# Patient Record
Sex: MALE | Race: WHITE | NOT HISPANIC OR LATINO | Employment: OTHER | URBAN - METROPOLITAN AREA
[De-identification: names, ages, dates, MRNs, and addresses within clinical notes are randomized per-mention and may not be internally consistent; named-entity substitution may affect disease eponyms.]

---

## 2021-03-15 DIAGNOSIS — Z23 NEED FOR VACCINATION: ICD-10-CM

## 2021-05-05 ENCOUNTER — APPOINTMENT (OUTPATIENT)
Dept: RADIOLOGY | Facility: MEDICAL CENTER | Age: 66
End: 2021-05-05
Attending: EMERGENCY MEDICINE
Payer: MEDICAID

## 2021-05-05 ENCOUNTER — HOSPITAL ENCOUNTER (EMERGENCY)
Facility: MEDICAL CENTER | Age: 66
End: 2021-05-05
Attending: EMERGENCY MEDICINE
Payer: MEDICAID

## 2021-05-05 VITALS
OXYGEN SATURATION: 95 % | BODY MASS INDEX: 28.78 KG/M2 | DIASTOLIC BLOOD PRESSURE: 87 MMHG | SYSTOLIC BLOOD PRESSURE: 138 MMHG | TEMPERATURE: 97.4 F | WEIGHT: 201.06 LBS | HEART RATE: 89 BPM | HEIGHT: 70 IN | RESPIRATION RATE: 16 BRPM

## 2021-05-05 DIAGNOSIS — L03.119 CELLULITIS OF FOOT: ICD-10-CM

## 2021-05-05 LAB
ALBUMIN SERPL BCP-MCNC: 4.1 G/DL (ref 3.2–4.9)
ALBUMIN/GLOB SERPL: 1.2 G/DL
ALP SERPL-CCNC: 99 U/L (ref 30–99)
ALT SERPL-CCNC: 14 U/L (ref 2–50)
ANION GAP SERPL CALC-SCNC: 9 MMOL/L (ref 7–16)
AST SERPL-CCNC: 27 U/L (ref 12–45)
BASOPHILS # BLD AUTO: 0.5 % (ref 0–1.8)
BASOPHILS # BLD: 0.05 K/UL (ref 0–0.12)
BILIRUB SERPL-MCNC: 0.4 MG/DL (ref 0.1–1.5)
BUN SERPL-MCNC: 20 MG/DL (ref 8–22)
CALCIUM SERPL-MCNC: 9.3 MG/DL (ref 8.4–10.2)
CHLORIDE SERPL-SCNC: 103 MMOL/L (ref 96–112)
CO2 SERPL-SCNC: 25 MMOL/L (ref 20–33)
CREAT SERPL-MCNC: 0.92 MG/DL (ref 0.5–1.4)
EOSINOPHIL # BLD AUTO: 0.08 K/UL (ref 0–0.51)
EOSINOPHIL NFR BLD: 0.9 % (ref 0–6.9)
ERYTHROCYTE [DISTWIDTH] IN BLOOD BY AUTOMATED COUNT: 42.9 FL (ref 35.9–50)
GLOBULIN SER CALC-MCNC: 3.3 G/DL (ref 1.9–3.5)
GLUCOSE SERPL-MCNC: 125 MG/DL (ref 65–99)
HCT VFR BLD AUTO: 43.6 % (ref 42–52)
HGB BLD-MCNC: 14.4 G/DL (ref 14–18)
IMM GRANULOCYTES # BLD AUTO: 0.07 K/UL (ref 0–0.11)
IMM GRANULOCYTES NFR BLD AUTO: 0.8 % (ref 0–0.9)
LYMPHOCYTES # BLD AUTO: 2.02 K/UL (ref 1–4.8)
LYMPHOCYTES NFR BLD: 21.7 % (ref 22–41)
MCH RBC QN AUTO: 30.4 PG (ref 27–33)
MCHC RBC AUTO-ENTMCNC: 33 G/DL (ref 33.7–35.3)
MCV RBC AUTO: 92.2 FL (ref 81.4–97.8)
MONOCYTES # BLD AUTO: 0.62 K/UL (ref 0–0.85)
MONOCYTES NFR BLD AUTO: 6.7 % (ref 0–13.4)
NEUTROPHILS # BLD AUTO: 6.48 K/UL (ref 1.82–7.42)
NEUTROPHILS NFR BLD: 69.4 % (ref 44–72)
NRBC # BLD AUTO: 0 K/UL
NRBC BLD-RTO: 0 /100 WBC
PLATELET # BLD AUTO: 240 K/UL (ref 164–446)
PMV BLD AUTO: 10.2 FL (ref 9–12.9)
POTASSIUM SERPL-SCNC: 5 MMOL/L (ref 3.6–5.5)
PROT SERPL-MCNC: 7.4 G/DL (ref 6–8.2)
RBC # BLD AUTO: 4.73 M/UL (ref 4.7–6.1)
SODIUM SERPL-SCNC: 137 MMOL/L (ref 135–145)
WBC # BLD AUTO: 9.3 K/UL (ref 4.8–10.8)

## 2021-05-05 PROCEDURE — 80053 COMPREHEN METABOLIC PANEL: CPT

## 2021-05-05 PROCEDURE — 96365 THER/PROPH/DIAG IV INF INIT: CPT

## 2021-05-05 PROCEDURE — 73630 X-RAY EXAM OF FOOT: CPT | Mod: LT

## 2021-05-05 PROCEDURE — 99284 EMERGENCY DEPT VISIT MOD MDM: CPT

## 2021-05-05 PROCEDURE — 85025 COMPLETE CBC W/AUTO DIFF WBC: CPT

## 2021-05-05 PROCEDURE — 700101 HCHG RX REV CODE 250: Performed by: EMERGENCY MEDICINE

## 2021-05-05 RX ORDER — GABAPENTIN 100 MG/1
100 CAPSULE ORAL 4 TIMES DAILY
COMMUNITY

## 2021-05-05 RX ORDER — ROSUVASTATIN CALCIUM 5 MG/1
5 TABLET, COATED ORAL DAILY
COMMUNITY

## 2021-05-05 RX ORDER — EMPAGLIFLOZIN AND METFORMIN HYDROCHLORIDE 12.5; 1 MG/1; MG/1
1 TABLET ORAL 2 TIMES DAILY
COMMUNITY

## 2021-05-05 RX ORDER — CLINDAMYCIN PHOSPHATE 600 MG/50ML
600 INJECTION, SOLUTION INTRAVENOUS ONCE
Status: COMPLETED | OUTPATIENT
Start: 2021-05-05 | End: 2021-05-05

## 2021-05-05 RX ORDER — LOSARTAN POTASSIUM 100 MG/1
100 TABLET ORAL DAILY
Status: SHIPPED | COMMUNITY
End: 2023-05-03

## 2021-05-05 RX ORDER — CIPROFLOXACIN 500 MG/1
500 TABLET, FILM COATED ORAL 2 TIMES DAILY
Qty: 14 TABLET | Refills: 0 | Status: SHIPPED | OUTPATIENT
Start: 2021-05-05 | End: 2021-11-20

## 2021-05-05 RX ORDER — CLINDAMYCIN HYDROCHLORIDE 300 MG/1
300 CAPSULE ORAL 3 TIMES DAILY
Qty: 40 CAPSULE | Refills: 0 | Status: SHIPPED | OUTPATIENT
Start: 2021-05-05 | End: 2021-11-20

## 2021-05-05 RX ADMIN — CLINDAMYCIN IN 5 PERCENT DEXTROSE 600 MG: 12 INJECTION, SOLUTION INTRAVENOUS at 16:50

## 2021-05-05 NOTE — ED NOTES
Med rec updated and complete  Allergies reviewed  Pt had a list of medications at bedside, went over list of medications and returned list of medications back to pt at bedside.  Pt reports no antibiotics in the last 2 weeks    No current facility-administered medications on file prior to encounter.     Current Outpatient Medications on File Prior to Encounter   Medication Sig Dispense Refill   • Cholecalciferol (D3 PO) Take 1 capsule by mouth every day.     • Dulaglutide (TRULICITY) 0.75 MG/0.5ML Solution Pen-injector Inject 0.5 mL under the skin every 7 days. On Monday     • Empagliflozin-metFORMIN HCl (SYNJARDY) 12.5-1000 MG Tab Take 1 tablet by mouth 2 times a day.     • rosuvastatin (CRESTOR) 5 MG Tab Take 5 mg by mouth every day.     • losartan (COZAAR) 100 MG Tab Take 100 mg by mouth every day.     • gabapentin (NEURONTIN) 100 MG Cap Take 100 mg by mouth 4 times a day. Pt takes @@ 2300, 0100, 0300, and 0500     • Insulin Glargine (BASAGLAR KWIKPEN SC) Inject 17 Units under the skin every evening.

## 2021-05-06 NOTE — DISCHARGE INSTRUCTIONS
Take the antibiotics as prescribed, keep your leg elevated as much as possible.  Return if swelling worsens, or if you develop redness of the foot.  Return immediately if you develop fevers

## 2021-05-06 NOTE — ED PROVIDER NOTES
Continue with same eyeglasses. ED Provider Note    ED Provider    Means of arrival: Private vehicle  History obtained from: Patient  History limited by: None    CHIEF COMPLAINT  Chief Complaint   Patient presents with   • Foot Problem     Left foot stepped on a nail  Sent from doctor for infection       HPI  Dixon Chung is a 65 y.o. male who presents with complaints of plantar puncture wound to the left foot, this happened almost 1 week ago and he has stepped on a nail that went through his shoe.  He is complaining of a swelling, to the foot.  He said no fevers chills or sweats, no shortness of breath no nausea or vomiting.  He does have peripheral neuropathy see he does not experience a significant pain to the area.    REVIEW OF SYSTEMS  See HPI for further details. All other systems are negative.     PAST MEDICAL HISTORY   has a past medical history of Diabetes, Diabetes mellitus type II (7/30/2009), HTN (hypertension), Hyperlipemia (7/30/2009), and Hypertension (7/30/2009).    SOCIAL HISTORY  Social History     Tobacco Use   • Smoking status: Never Smoker   Substance and Sexual Activity   • Alcohol use: Not on file   • Drug use: Yes     Comment: marijuana   • Sexual activity: Yes     Partners: Female       SURGICAL HISTORY   has a past surgical history that includes tonsillectomy and hernia repair.    CURRENT MEDICATIONS  Home Medications     Reviewed by Vicente Betancourt (Pharmacy Tech) on 05/05/21 at 1620  Med List Status: Complete   Medication Last Dose Status   Cholecalciferol (D3 PO) 5/5/2021 Active   Dulaglutide (TRULICITY) 0.75 MG/0.5ML Solution Pen-injector 5/3/2021 Active   Empagliflozin-metFORMIN HCl (SYNJARDY) 12.5-1000 MG Tab 5/5/2021 Active   gabapentin (NEURONTIN) 100 MG Cap 5/5/2021 Active   Insulin Glargine (BASAGLAR KWIKPEN SC) 5/4/2021 Active   lisinopril (PRINIVIL) 20 MG TABS Not Taking Active   losartan (COZAAR) 100 MG Tab 5/5/2021 Active   metformin (GLUCOPHAGE) 1000 MG tablet Not Taking Active   metformin  "(GLUCOPHAGE) 500 MG TABS Not Taking Active   rosuvastatin (CRESTOR) 5 MG Tab 5/5/2021 Active                ALLERGIES  Allergies   Allergen Reactions   • Amlodipine      Peripheral edema   • Lisinopril      Pt is not sure what happen, but knows that he has an allergie to this medication        PHYSICAL EXAM  VITAL SIGNS: /76   Pulse 77   Temp 36.3 °C (97.4 °F) (Temporal)   Resp 16   Ht 1.778 m (5' 10\")   Wt 91.2 kg (201 lb 1 oz)   SpO2 97%   BMI 28.85 kg/m²   Constitutional: Alert in no apparent distress.  HENT: No signs of trauma, Mucous membranes are moist   Eyes:  Conjunctiva normal, Non-icteric.   Neck: Normal range of motion, No tenderness, Supple,  Lymphatic: No lymphadenopathy noted.   Cardiovascular: Regular rate and rhythm, no murmurs.   Thorax & Lungs: Normal breath sounds, No respiratory distress, No wheezing, No chest tenderness.   Abdomen: Bowel sounds normal, Soft, No tenderness, No masses, No pulsatile masses. No peritoneal signs.  Skin: Warm, Dry,Normal color  Back: No bony tenderness, No CVA tenderness.   Extremities:No edema, No tenderness, No cyanosis, there is swelling of the left foot, up to the distal third of the calf, there is a puncture wound in the plantar surface of the ball of the foot, no surrounding erythema, no abscess palpable  Musculoskeletal: Good range of motion in all major joints. No tenderness to palpation or major deformities noted.   Neurologic: Alert ,Oriented x4, Normal motor function, decreased sensory bilateral lower extremity, No focal deficits noted.   Psychiatric: Affect normal, Judgment normal, Mood normal.       MEDICAL DECISION MAKING  This is a 65 y.o. male who presents plantar puncture wound.  The area is swollen, does not appear erythematous and no palpable abscess is found. , and x-ray be done to rule out foreign body.  Be started empirically on antibiotics.    DIAGNOSTIC STUDIES / PROCEDURES    EKG      LABS  ED Provider    Results for orders placed " or performed during the hospital encounter of 05/05/21   CBC WITH DIFFERENTIAL   Result Value Ref Range    WBC 9.3 4.8 - 10.8 K/uL    RBC 4.73 4.70 - 6.10 M/uL    Hemoglobin 14.4 14.0 - 18.0 g/dL    Hematocrit 43.6 42.0 - 52.0 %    MCV 92.2 81.4 - 97.8 fL    MCH 30.4 27.0 - 33.0 pg    MCHC 33.0 (L) 33.7 - 35.3 g/dL    RDW 42.9 35.9 - 50.0 fL    Platelet Count 240 164 - 446 K/uL    MPV 10.2 9.0 - 12.9 fL    Neutrophils-Polys 69.40 44.00 - 72.00 %    Lymphocytes 21.70 (L) 22.00 - 41.00 %    Monocytes 6.70 0.00 - 13.40 %    Eosinophils 0.90 0.00 - 6.90 %    Basophils 0.50 0.00 - 1.80 %    Immature Granulocytes 0.80 0.00 - 0.90 %    Nucleated RBC 0.00 /100 WBC    Neutrophils (Absolute) 6.48 1.82 - 7.42 K/uL    Lymphs (Absolute) 2.02 1.00 - 4.80 K/uL    Monos (Absolute) 0.62 0.00 - 0.85 K/uL    Eos (Absolute) 0.08 0.00 - 0.51 K/uL    Baso (Absolute) 0.05 0.00 - 0.12 K/uL    Immature Granulocytes (abs) 0.07 0.00 - 0.11 K/uL    NRBC (Absolute) 0.00 K/uL   COMP METABOLIC PANEL   Result Value Ref Range    Sodium 137 135 - 145 mmol/L    Potassium 6.1 (H) 3.6 - 5.5 mmol/L    Chloride 103 96 - 112 mmol/L    Co2 25 20 - 33 mmol/L    Anion Gap 9.0 7.0 - 16.0    Glucose 125 (H) 65 - 99 mg/dL    Bun 20 8 - 22 mg/dL    Creatinine 0.92 0.50 - 1.40 mg/dL    Calcium 9.3 8.4 - 10.2 mg/dL    AST(SGOT) 27 12 - 45 U/L    ALT(SGPT) 14 2 - 50 U/L    Alkaline Phosphatase 99 30 - 99 U/L    Total Bilirubin 0.4 0.1 - 1.5 mg/dL    Albumin 4.1 3.2 - 4.9 g/dL    Total Protein 7.4 6.0 - 8.2 g/dL    Globulin 3.3 1.9 - 3.5 g/dL    A-G Ratio 1.2 g/dL   ESTIMATED GFR   Result Value Ref Range    GFR If African American >60 >60 mL/min/1.73 m 2    GFR If Non African American >60 >60 mL/min/1.73 m 2         RADIOLOGY  DX-FOOT-COMPLETE 3+ LEFT   Final Result      1.  There is forefoot swelling without foreign body or soft tissue gas.          COURSE  Pertinent Labs & Imaging studies reviewed. (See chart for details)    5:04 PM - Patient seen and examined at  bedside. Discussed plan of care,    X-ray shows no foreign body.  His potassium was listed very high.  Spoke with the , his sample had been hemolyzed which causes elevation.  A another sample was run and it was 5.0.  This was per the  is out tonight.    Patient has no signs of sepsis.  IV antibiotics were started empirically is discharged home with antibiotics.    Discharged home in stable condition    FINAL IMPRESSION  1. Cellulitis of foot        The note accurately reflects work and decisions made by me.  Barney Deleon D.O.  5/5/2021  6:16 PM

## 2021-05-11 ENCOUNTER — NURSE TRIAGE (OUTPATIENT)
Dept: HEALTH INFORMATION MANAGEMENT | Facility: OTHER | Age: 66
End: 2021-05-11

## 2021-05-11 NOTE — TELEPHONE ENCOUNTER
Pt having mild diarrhea with antibiotic use. Advised home care for pt    Reason for Disposition  • MILD diarrhea and taking antibiotics    Additional Information  • Negative: Shock suspected (e.g., cold/pale/clammy skin, too weak to stand, low BP, rapid pulse)  • Negative: Difficult to awaken or acting confused (e.g., disoriented, slurred speech)  • Negative: Sounds like a life-threatening emergency to the triager  • Negative: Vomiting also present and worse than the diarrhea  • Negative: Blood in stool and without diarrhea  • Negative: SEVERE abdominal pain (e.g., excruciating) and present > 1 hour  • Negative: SEVERE abdominal pain and age > 60  • Negative: Bloody, black, or tarry bowel movements  • Negative: SEVERE diarrhea (e.g., 7 or more times / day more than normal) and age > 60 years  • Negative: Constant abdominal pain lasting > 2 hours  • Negative: Drinking very little and has signs of dehydration (e.g., no urine > 12 hours, very dry mouth, very lightheaded)  • Negative: Patient sounds very sick or weak to the triager  • Negative: SEVERE diarrhea (e.g., 7 or more times / day more than normal) and present > 24 hours (1 day)  • Negative: MODERATE diarrhea (e.g., 4-6 times / day more than normal) and present > 48 hours (2 days)  • Negative: MODERATE diarrhea (e.g., 4-6 times / day more than normal) and age > 70 years  • Negative: Abdominal pain  (Exception: pain clears completely with each passage of diarrhea stool)  • Negative: Fever > 101 F (38.3 C)  • Negative: Blood in the stool  • Negative: Mucus or pus in stool has been present > 2 days and diarrhea is more than mild  • Negative: Weak immune system (e.g., HIV positive, cancer chemo, splenectomy, organ transplant, chronic steroids)  • Negative: Travel to a foreign country in past month  • Negative: Recent antibiotic therapy (i.e., within last 2 months) and diarrhea present > 3 days since antibiotic was stopped  • Negative: Recent hospitalization and  "diarrhea present > 3 days  • Negative: Tube feedings (e.g., nasogastric, g-tube, j-tube)  • Negative: MILD diarrhea (e.g., 1-3 or more stools than normal in past 24 hours) diarrhea without known cause and present > 7 days  • Negative: Patient wants to be seen  • Negative: Diarrhea is a chronic symptom (recurrent or ongoing AND lasting > 4 weeks)  • Negative: SEVERE diarrhea (e.g., 7 or more times / day more than normal)  • Negative: MILD-MODERATE diarrhea (e.g., 1-6 times / day more than normal)    Answer Assessment - Initial Assessment Questions  1. DIARRHEA SEVERITY: \"How bad is the diarrhea?\" \"How many extra stools have you had in the past 24 hours than normal?\"     - NO DIARRHEA (SCALE 0)    - MILD (SCALE 1-3): Few loose or mushy BMs; increase of 1-3 stools over normal daily number of stools; mild increase in ostomy output.    -  MODERATE (SCALE 4-7): Increase of 4-6 stools daily over normal; moderate increase in ostomy output.  * SEVERE (SCALE 8-10; OR 'WORST POSSIBLE'): Increase of 7 or more stools daily over normal; moderate increase in ostomy output; incontinence.      mild  2. ONSET: \"When did the diarrhea begin?\"       2 days ago  3. BM CONSISTENCY: \"How loose or watery is the diarrhea?\"       Loose and watery  4. VOMITING: \"Are you also vomiting?\" If so, ask: \"How many times in the past 24 hours?\"       no  5. ABDOMINAL PAIN: \"Are you having any abdominal pain?\" If yes: \"What does it feel like?\" (e.g., crampy, dull, intermittent, constant)       crampy  6. ABDOMINAL PAIN SEVERITY: If present, ask: \"How bad is the pain?\"  (e.g., Scale 1-10; mild, moderate, or severe)    - MILD (1-3): doesn't interfere with normal activities, abdomen soft and not tender to touch     - MODERATE (4-7): interferes with normal activities or awakens from sleep, tender to touch     - SEVERE (8-10): excruciating pain, doubled over, unable to do any normal activities        cramping  7. ORAL INTAKE: If vomiting, \"Have you been able " "to drink liquids?\" \"How much fluids have you had in the past 24 hours?\"      NA  8. HYDRATION: \"Any signs of dehydration?\" (e.g., dry mouth [not just dry lips], too weak to stand, dizziness, new weight loss) \"When did you last urinate?\"      no  9. EXPOSURE: \"Have you traveled to a foreign country recently?\" \"Have you been exposed to anyone with diarrhea?\" \"Could you have eaten any food that was spoiled?\"      no  10. ANTIBIOTIC USE: \"Are you taking antibiotics now or have you taken antibiotics in the past 2 months?\"        Yes taking antibiotics because he stepped on a nail  11. OTHER SYMPTOMS: \"Do you have any other symptoms?\" (e.g., fever, blood in stool)        no  12. PREGNANCY: \"Is there any chance you are pregnant?\" \"When was your last menstrual period?\"        NA    Protocols used: DIARRHEA-A-OH      "

## 2021-11-20 ENCOUNTER — APPOINTMENT (OUTPATIENT)
Dept: RADIOLOGY | Facility: MEDICAL CENTER | Age: 66
End: 2021-11-20
Attending: EMERGENCY MEDICINE
Payer: MEDICAID

## 2021-11-20 ENCOUNTER — HOSPITAL ENCOUNTER (EMERGENCY)
Facility: MEDICAL CENTER | Age: 66
End: 2021-11-20
Attending: EMERGENCY MEDICINE
Payer: MEDICAID

## 2021-11-20 VITALS
HEART RATE: 71 BPM | HEIGHT: 70 IN | WEIGHT: 202.82 LBS | BODY MASS INDEX: 29.04 KG/M2 | SYSTOLIC BLOOD PRESSURE: 173 MMHG | TEMPERATURE: 97.3 F | RESPIRATION RATE: 16 BRPM | OXYGEN SATURATION: 97 % | DIASTOLIC BLOOD PRESSURE: 87 MMHG

## 2021-11-20 DIAGNOSIS — R22.1 NECK MASS: ICD-10-CM

## 2021-11-20 DIAGNOSIS — I65.23 ATHEROSCLEROSIS OF BOTH CAROTID ARTERIES: ICD-10-CM

## 2021-11-20 DIAGNOSIS — K11.20 PAROTIDITIS: ICD-10-CM

## 2021-11-20 LAB
ALBUMIN SERPL BCP-MCNC: 4.2 G/DL (ref 3.2–4.9)
ALBUMIN/GLOB SERPL: 1.7 G/DL
ALP SERPL-CCNC: 69 U/L (ref 30–99)
ALT SERPL-CCNC: 19 U/L (ref 2–50)
ANION GAP SERPL CALC-SCNC: 12 MMOL/L (ref 7–16)
AST SERPL-CCNC: 20 U/L (ref 12–45)
BASOPHILS # BLD AUTO: 0.6 % (ref 0–1.8)
BASOPHILS # BLD: 0.07 K/UL (ref 0–0.12)
BILIRUB SERPL-MCNC: 0.3 MG/DL (ref 0.1–1.5)
BUN SERPL-MCNC: 15 MG/DL (ref 8–22)
CALCIUM SERPL-MCNC: 9.2 MG/DL (ref 8.4–10.2)
CHLORIDE SERPL-SCNC: 102 MMOL/L (ref 96–112)
CO2 SERPL-SCNC: 22 MMOL/L (ref 20–33)
CREAT SERPL-MCNC: 0.92 MG/DL (ref 0.5–1.4)
EOSINOPHIL # BLD AUTO: 0.09 K/UL (ref 0–0.51)
EOSINOPHIL NFR BLD: 0.7 % (ref 0–6.9)
ERYTHROCYTE [DISTWIDTH] IN BLOOD BY AUTOMATED COUNT: 44.1 FL (ref 35.9–50)
GLOBULIN SER CALC-MCNC: 2.5 G/DL (ref 1.9–3.5)
GLUCOSE SERPL-MCNC: 136 MG/DL (ref 65–99)
HCT VFR BLD AUTO: 40.9 % (ref 42–52)
HGB BLD-MCNC: 14 G/DL (ref 14–18)
IMM GRANULOCYTES # BLD AUTO: 0.08 K/UL (ref 0–0.11)
IMM GRANULOCYTES NFR BLD AUTO: 0.6 % (ref 0–0.9)
LACTATE BLD-SCNC: 1.6 MMOL/L (ref 0.5–2)
LYMPHOCYTES # BLD AUTO: 2.31 K/UL (ref 1–4.8)
LYMPHOCYTES NFR BLD: 18.2 % (ref 22–41)
MCH RBC QN AUTO: 31.4 PG (ref 27–33)
MCHC RBC AUTO-ENTMCNC: 34.2 G/DL (ref 33.7–35.3)
MCV RBC AUTO: 91.7 FL (ref 81.4–97.8)
MONOCYTES # BLD AUTO: 0.69 K/UL (ref 0–0.85)
MONOCYTES NFR BLD AUTO: 5.5 % (ref 0–13.4)
NEUTROPHILS # BLD AUTO: 9.42 K/UL (ref 1.82–7.42)
NEUTROPHILS NFR BLD: 74.4 % (ref 44–72)
NRBC # BLD AUTO: 0 K/UL
NRBC BLD-RTO: 0 /100 WBC
PLATELET # BLD AUTO: 248 K/UL (ref 164–446)
PMV BLD AUTO: 10.1 FL (ref 9–12.9)
POTASSIUM SERPL-SCNC: 5 MMOL/L (ref 3.6–5.5)
PROT SERPL-MCNC: 6.7 G/DL (ref 6–8.2)
RBC # BLD AUTO: 4.46 M/UL (ref 4.7–6.1)
SODIUM SERPL-SCNC: 136 MMOL/L (ref 135–145)
WBC # BLD AUTO: 12.7 K/UL (ref 4.8–10.8)

## 2021-11-20 PROCEDURE — 85025 COMPLETE CBC W/AUTO DIFF WBC: CPT

## 2021-11-20 PROCEDURE — 80053 COMPREHEN METABOLIC PANEL: CPT

## 2021-11-20 PROCEDURE — 70491 CT SOFT TISSUE NECK W/DYE: CPT

## 2021-11-20 PROCEDURE — A9270 NON-COVERED ITEM OR SERVICE: HCPCS | Performed by: EMERGENCY MEDICINE

## 2021-11-20 PROCEDURE — 83605 ASSAY OF LACTIC ACID: CPT

## 2021-11-20 PROCEDURE — 700117 HCHG RX CONTRAST REV CODE 255: Performed by: EMERGENCY MEDICINE

## 2021-11-20 PROCEDURE — 99284 EMERGENCY DEPT VISIT MOD MDM: CPT

## 2021-11-20 PROCEDURE — 700102 HCHG RX REV CODE 250 W/ 637 OVERRIDE(OP): Performed by: EMERGENCY MEDICINE

## 2021-11-20 RX ORDER — AMOXICILLIN AND CLAVULANATE POTASSIUM 875; 125 MG/1; MG/1
1 TABLET, FILM COATED ORAL 2 TIMES DAILY
Qty: 14 TABLET | Refills: 0 | Status: SHIPPED | OUTPATIENT
Start: 2021-11-20 | End: 2021-11-27

## 2021-11-20 RX ORDER — ACETAMINOPHEN 500 MG
500-1000 TABLET ORAL EVERY 6 HOURS PRN
Status: SHIPPED | COMMUNITY
End: 2022-04-25

## 2021-11-20 RX ORDER — AMOXICILLIN AND CLAVULANATE POTASSIUM 875; 125 MG/1; MG/1
1 TABLET, FILM COATED ORAL ONCE
Status: COMPLETED | OUTPATIENT
Start: 2021-11-20 | End: 2021-11-20

## 2021-11-20 RX ADMIN — AMOXICILLIN AND CLAVULANATE POTASSIUM 1 TABLET: 875; 125 TABLET, FILM COATED ORAL at 16:04

## 2021-11-20 RX ADMIN — IOHEXOL 100 ML: 350 INJECTION, SOLUTION INTRAVENOUS at 15:06

## 2021-11-20 ASSESSMENT — FIBROSIS 4 INDEX: FIB4 SCORE: 1.95

## 2021-11-20 ASSESSMENT — PAIN DESCRIPTION - PAIN TYPE: TYPE: ACUTE PAIN

## 2021-11-20 NOTE — ED NOTES
Med rec partially completed per pt   Allergies reviewed  No PO antibiotics in the last 30 days    Pt is unsure of what dose of Trulicity he uses  Home pharmacy ([facility]) is currently closed till Monday  Unable to verify at this time

## 2021-11-20 NOTE — ED TRIAGE NOTES
"Presents complaining of left neck painful swelling recurring since yesterday.  He denies any fever, and he describes escalating pain upon palpation or swallowing.   Chief Complaint   Patient presents with   • Neck Swelling   • Neck Pain   /78   Pulse 80   Temp 36 °C (96.8 °F) (Temporal)   Resp 18   Ht 1.778 m (5' 10\")   Wt 92 kg (202 lb 13.2 oz)   SpO2 99%   BMI 29.10 kg/m²   Has this patient been vaccinated for COVID YES  If not, would they like to be vaccinated while in the ER if eligible?  N/A  Would the patient like to speak with the ERP about the possibility of receiving the COVID vaccine today before making a decision? N/A     "

## 2021-11-20 NOTE — ED PROVIDER NOTES
ED Provider Note    CHIEF COMPLAINT  Chief Complaint   Patient presents with   • Neck Swelling   • Neck Pain       HPI  Dixon Chung is a 66 y.o. male who presents to the emergency department complaining of neck pain and swelling.  Patient has developed pain and swelling on the left side of his neck to the angle of his jaw that started yesterday.  He saw his primary care provider yesterday for routine follow-up of his diabetes but since that time the swelling has increased in size and more than doubled.  Denies any drooling or trismus.  Denies any difficulty swallowing or breathing.  Denies any fevers or chills.  Denies any systemic complaints.    REVIEW OF SYSTEMS  See HPI for further details. All other systems are negative.    PAST MEDICAL HISTORY  Past Medical History:   Diagnosis Date   • Diabetes     type II   • Diabetes mellitus type II 7/30/2009   • HTN (hypertension)    • Hyperlipemia 7/30/2009   • Hypertension 7/30/2009       FAMILY HISTORY  Family History   Problem Relation Age of Onset   • Heart Disease Father         cabg [id number]   • Hypertension Father    • Diabetes Paternal Aunt        SOCIAL HISTORY  Social History     Socioeconomic History   • Marital status: Single     Spouse name: Not on file   • Number of children: Not on file   • Years of education: Not on file   • Highest education level: Not on file   Occupational History   • Not on file   Tobacco Use   • Smoking status: Never Smoker   • Smokeless tobacco: Never Used   Vaping Use   • Vaping Use: Never used   Substance and Sexual Activity   • Alcohol use: Not Currently     Alcohol/week: 2.0 oz     Types: 4 Cans of beer per week   • Drug use: Yes     Comment: Marijuana   • Sexual activity: Yes     Partners: Female   Other Topics Concern   • Not on file   Social History Narrative   • Not on file     Social Determinants of Health     Financial Resource Strain:    • Difficulty of Paying Living Expenses: Not on file   Food Insecurity:    • Worried  About Running Out of Food in the Last Year: Not on file   • Ran Out of Food in the Last Year: Not on file   Transportation Needs:    • Lack of Transportation (Medical): Not on file   • Lack of Transportation (Non-Medical): Not on file   Physical Activity:    • Days of Exercise per Week: Not on file   • Minutes of Exercise per Session: Not on file   Stress:    • Feeling of Stress : Not on file   Social Connections:    • Frequency of Communication with Friends and Family: Not on file   • Frequency of Social Gatherings with Friends and Family: Not on file   • Attends [employer] Services: Not on file   • Active Member of Clubs or Organizations: Not on file   • Attends Club or Organization Meetings: Not on file   • Marital Status: Not on file   Intimate Partner Violence:    • Fear of Current or Ex-Partner: Not on file   • Emotionally Abused: Not on file   • Physically Abused: Not on file   • Sexually Abused: Not on file   Housing Stability:    • Unable to Pay for Housing in the Last Year: Not on file   • Number of Places Lived in the Last Year: Not on file   • Unstable Housing in the Last Year: Not on file       SURGICAL HISTORY  Past Surgical History:   Procedure Laterality Date   • HERNIA REPAIR     • TONSILLECTOMY         CURRENT MEDICATIONS  Home Medications     Reviewed by Vicente Boudreaux (Pharmacy Tech) on 11/20/21 at 1339  Med List Status: Partial   Medication Last Dose Status   acetaminophen (TYLENOL) 500 MG Tab 11/20/2021 Active   Dulaglutide (TRULICITY SC) 11/15/2021 Active   Empagliflozin-metFORMIN HCl (SYNJARDY) 12.5-1000 MG Tab 11/20/2021 Active   gabapentin (NEURONTIN) 100 MG Cap 11/20/2021 Active   insulin glargine (INSULIN GLARGINE) 100 UNIT/ML Solution Pen-injector injection 11/19/2021 Active   losartan (COZAAR) 100 MG Tab 11/20/2021 Active   rosuvastatin (CRESTOR) 5 MG Tab 11/20/2021 Active                ALLERGIES  Allergies   Allergen Reactions   • Amlodipine      Peripheral edema   • Lisinopril      " Pt is not sure what happen, but knows that he has an allergie to this medication        PHYSICAL EXAM  VITAL SIGNS: /78   Pulse 80   Temp 36 °C (96.8 °F) (Temporal)   Resp 16   Ht 1.778 m (5' 10\")   Wt 92 kg (202 lb 13.2 oz)   SpO2 99%   BMI 29.10 kg/m²    Constitutional: Well developed, Well nourished, No acute distress, Non-toxic appearance.   HENT: Normocephalic, Atraumatic, Bilateral external ears normal, poor dentition with one tooth on the left lower related to the gingiva.  No obvious abscess.  No obvious infection  Eyes: PERRL, EOMI, Conjunctiva normal, No discharge.   Neck: Normal range of motion.  Swelling on the angle of the jaw on the left.  Is red and swollen not tense.  Cardiovascular: Normal heart rate, Normal rhythm, No murmurs, No rubs, No gallops.   Thorax & Lungs: Normal breath sounds, No respiratory distress, No wheezing,   Abdomen: Bowel sounds normal, Soft, No tenderness  Skin: Warm, Dry, No erythema, No rash.    Musculoskeletal: Good range of motion in all major joints.  Neurologic: Alert, No focal deficits noted.   Psychiatric: Affect normal    Results for orders placed or performed during the hospital encounter of 11/20/21   CBC WITH DIFFERENTIAL   Result Value Ref Range    WBC 12.7 (H) 4.8 - 10.8 K/uL    RBC 4.46 (L) 4.70 - 6.10 M/uL    Hemoglobin 14.0 14.0 - 18.0 g/dL    Hematocrit 40.9 (L) 42.0 - 52.0 %    MCV 91.7 81.4 - 97.8 fL    MCH 31.4 27.0 - 33.0 pg    MCHC 34.2 33.7 - 35.3 g/dL    RDW 44.1 35.9 - 50.0 fL    Platelet Count 248 164 - 446 K/uL    MPV 10.1 9.0 - 12.9 fL    Neutrophils-Polys 74.40 (H) 44.00 - 72.00 %    Lymphocytes 18.20 (L) 22.00 - 41.00 %    Monocytes 5.50 0.00 - 13.40 %    Eosinophils 0.70 0.00 - 6.90 %    Basophils 0.60 0.00 - 1.80 %    Immature Granulocytes 0.60 0.00 - 0.90 %    Nucleated RBC 0.00 /100 WBC    Neutrophils (Absolute) 9.42 (H) 1.82 - 7.42 K/uL    Lymphs (Absolute) 2.31 1.00 - 4.80 K/uL    Monos (Absolute) 0.69 0.00 - 0.85 K/uL    Eos " (Absolute) 0.09 0.00 - 0.51 K/uL    Baso (Absolute) 0.07 0.00 - 0.12 K/uL    Immature Granulocytes (abs) 0.08 0.00 - 0.11 K/uL    NRBC (Absolute) 0.00 K/uL   COMP METABOLIC PANEL   Result Value Ref Range    Sodium 136 135 - 145 mmol/L    Potassium 5.0 3.6 - 5.5 mmol/L    Chloride 102 96 - 112 mmol/L    Co2 22 20 - 33 mmol/L    Anion Gap 12.0 7.0 - 16.0    Glucose 136 (H) 65 - 99 mg/dL    Bun 15 8 - 22 mg/dL    Creatinine 0.92 0.50 - 1.40 mg/dL    Calcium 9.2 8.4 - 10.2 mg/dL    AST(SGOT) 20 12 - 45 U/L    ALT(SGPT) 19 2 - 50 U/L    Alkaline Phosphatase 69 30 - 99 U/L    Total Bilirubin 0.3 0.1 - 1.5 mg/dL    Albumin 4.2 3.2 - 4.9 g/dL    Total Protein 6.7 6.0 - 8.2 g/dL    Globulin 2.5 1.9 - 3.5 g/dL    A-G Ratio 1.7 g/dL   LACTIC ACID   Result Value Ref Range    Lactic Acid 1.6 0.5 - 2.0 mmol/L   ESTIMATED GFR   Result Value Ref Range    GFR If African American >60 >60 mL/min/1.73 m 2    GFR If Non African American >60 >60 mL/min/1.73 m 2      RADIOLOGY/PROCEDURES  CT-SOFT TISSUE NECK WITH   Final Result   Addendum 1 of 1   There is bilateral internal carotid artery atherosclerotic plaque.      Left internal carotid artery stenosis is probably moderate in degree    estimated at 50-75%.      Right internal carotid artery stenosis is mild in degree estimated less    than 50%.      Findings were discussed with ROBERTO NAVAS on 11/20/2021 4:08 PM.      Final      1.  No acute abnormality      2.  Enlarged thyroid containing subcentimeter nodule             COURSE & MEDICAL DECISION MAKING  Pertinent Labs & Imaging studies reviewed. (See chart for details)      The patient presents to the emergency department with pain and swelling on the left side of the neck for 2 days duration.  Differential diagnosis includes but is not limited to dental abscess parotiditis facial abscess, cellulitis.    The patient's work-up with labs and a CT scan of the neck.  Labs show mild leukocytosis.  Metabolic panel shows mild  hyperglycemia but is otherwise unremarkable.    CT of the neck did not show an abscess.  There is asymmetry in the parotid area on the left which is where the swelling is.  This is reassuring there is no abscess.  I suspect he has parotid otitis.  We will have him eat a sugar-free sour candy.  He be put on Augmentin 875 twice daily for 7 days.  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  Otherwise she is to follow-up with his doctor continue antibiotics as prescribed.  He verbalizes understanding.    The patient expressed concern about his carotid arteries.  He does not have a carotid artery aneurysm or bleeding.  I did scrutinize his CT and see some significant plaquing.  This is further discussed with the radiologist.  The patient does not have strokelike symptoms at this time.  He has not had TIA symptoms.  I discussed this finding with Dr. Cohen on-call for vascular surgery.  He will see the patient in follow-up and follow-up in a surveillance program to see if he needs any intervention.  Does not recommend antiplatelet medications at this time.    The patient is referred to his doctor as well to Dr. Cohen.  Return for pain swelling redness fever or other concerns.  Questions are answered, agreeable to plan and discharged in good condition.    The patient was noted to have elevated blood pressure while in the ER and was counseled to see their doctor within one wee to have this rechecked.    WESTON Christian.  [address] [facility] [id number] [phone]          Garth Andres M.D.  [address] [place] [id number] [phone]    Schedule an appointment as soon as possible for a visit in 1 week          FINAL IMPRESSION  1. Neck mass     2. Parotiditis  amoxicillin-clavulanate (AUGMENTIN) 875-125 MG Tab   3. Atherosclerosis of both carotid arteries         3.          Electronically signed by: Carlos Alberto Dean M.D., 11/20/2021 1:27 PM

## 2021-11-21 NOTE — DISCHARGE INSTRUCTIONS
Take antibiotics as prescribed for the swelling on your neck.  Consider taking the sugar sour candy to help with the swelling.  Return to the emergency department at [facility] for increasing pain, swelling, or other concerns.  Follow-up with your doctor intake antibiotics as prescribed.    You had some narrowing of your carotid artery on the left side.  This is not related to the swelling of today.  This does acquire continued follow-up as an outpatient.  Follow-up with Dr. Cohen within 1 week.

## 2021-11-30 ENCOUNTER — HOSPITAL ENCOUNTER (EMERGENCY)
Facility: MEDICAL CENTER | Age: 66
End: 2021-11-30
Attending: EMERGENCY MEDICINE | Admitting: EMERGENCY MEDICINE
Payer: MEDICAID

## 2021-11-30 VITALS
SYSTOLIC BLOOD PRESSURE: 166 MMHG | WEIGHT: 203.26 LBS | BODY MASS INDEX: 29.1 KG/M2 | RESPIRATION RATE: 16 BRPM | HEART RATE: 82 BPM | OXYGEN SATURATION: 96 % | HEIGHT: 70 IN | TEMPERATURE: 97 F | DIASTOLIC BLOOD PRESSURE: 104 MMHG

## 2021-11-30 DIAGNOSIS — K11.20 PAROTITIS: ICD-10-CM

## 2021-11-30 PROCEDURE — 99282 EMERGENCY DEPT VISIT SF MDM: CPT

## 2021-11-30 RX ORDER — PREDNISONE 20 MG/1
60 TABLET ORAL DAILY
Qty: 15 TABLET | Refills: 0 | Status: SHIPPED | OUTPATIENT
Start: 2021-11-30 | End: 2021-12-05

## 2021-11-30 ASSESSMENT — FIBROSIS 4 INDEX: FIB4 SCORE: 1.2

## 2021-11-30 NOTE — ED PROVIDER NOTES
"ED Provider Note    CHIEF COMPLAINT  Chief Complaint   Patient presents with   • Neck Pain     Pt states was treated last week for a \"swollen salivary gland\", continues to have pain       HPI  Dixon Chung is a 66 y.o. male who presents with left neck pain and swelling.  The patient states he was seen here for a swollen salivary gland about 10 days ago.  He states he finished antibiotics and all it did was caused a yeast infection to the tip of the penis.  He states he said persistent pain and swelling in this region with no improvement but he states it also does not seem to be getting worse.  He does not have any difficulty with breathing or swallowing.  He has not had any associated fevers.  He has not had any nausea or vomiting.    REVIEW OF SYSTEMS  See HPI for further details. All other systems are negative.     PAST MEDICAL HISTORY  Past Medical History:   Diagnosis Date   • Hyperlipemia 7/30/2009   • Hypertension 7/30/2009   • Diabetes mellitus type II 7/30/2009   • COVID-19    • Diabetes     type II   • HTN (hypertension)        FAMILY HISTORY  [unfilled]    SOCIAL HISTORY  Social History     Socioeconomic History   • Marital status: Single     Spouse name: Not on file   • Number of children: Not on file   • Years of education: Not on file   • Highest education level: Not on file   Occupational History   • Not on file   Tobacco Use   • Smoking status: Never Smoker   • Smokeless tobacco: Never Used   Vaping Use   • Vaping Use: Never used   Substance and Sexual Activity   • Alcohol use: Yes     Alcohol/week: 2.0 oz     Types: 4 Cans of beer per week   • Drug use: Yes     Comment: Marijuana   • Sexual activity: Yes     Partners: Female   Other Topics Concern   • Not on file   Social History Narrative   • Not on file     Social Determinants of Health     Financial Resource Strain:    • Difficulty of Paying Living Expenses: Not on file   Food Insecurity:    • Worried About Running Out of Food in the Last " "Year: Not on file   • Ran Out of Food in the Last Year: Not on file   Transportation Needs:    • Lack of Transportation (Medical): Not on file   • Lack of Transportation (Non-Medical): Not on file   Physical Activity:    • Days of Exercise per Week: Not on file   • Minutes of Exercise per Session: Not on file   Stress:    • Feeling of Stress : Not on file   Social Connections:    • Frequency of Communication with Friends and Family: Not on file   • Frequency of Social Gatherings with Friends and Family: Not on file   • Attends Latter day Services: Not on file   • Active Member of Clubs or Organizations: Not on file   • Attends Club or Organization Meetings: Not on file   • Marital Status: Not on file   Intimate Partner Violence:    • Fear of Current or Ex-Partner: Not on file   • Emotionally Abused: Not on file   • Physically Abused: Not on file   • Sexually Abused: Not on file   Housing Stability:    • Unable to Pay for Housing in the Last Year: Not on file   • Number of Places Lived in the Last Year: Not on file   • Unstable Housing in the Last Year: Not on file       SURGICAL HISTORY  Past Surgical History:   Procedure Laterality Date   • HERNIA REPAIR     • TONSILLECTOMY         CURRENT MEDICATIONS  Home Medications    **Home medications have not yet been reviewed for this encounter**         ALLERGIES  Allergies   Allergen Reactions   • Amlodipine      Peripheral edema   • Lisinopril      Pt is not sure what happen, but knows that he has an allergie to this medication        PHYSICAL EXAM  VITAL SIGNS: BP (!) 203/121 Comment: \"white coat syndrome\"  Pulse 83   Temp 36.4 °C (97.5 °F) (Temporal)   Resp 17   Ht 1.778 m (5' 10\")   Wt 92.2 kg (203 lb 4.2 oz)   SpO2 97%   BMI 29.17 kg/m²       Constitutional: Anxious but nontoxic.   HENT: Pain and swelling at the angle of the mandible on the left, Bilateral external ears normal, Oropharynx moist, No oral exudates, Nose normal.   Eyes: PERRLA, EOMI, Conjunctiva " normal, No discharge.   Neck: Normal range of motion, No tenderness, Supple, No stridor.   Lymphatic: No lymphadenopathy noted.   Cardiovascular: Normal heart rate, Normal rhythm, No murmurs, No rubs, No gallops.   Thorax & Lungs: Normal breath sounds, No respiratory distress, No wheezing, No chest tenderness.   Abdomen: Bowel sounds normal, Soft, No tenderness, No masses, No pulsatile masses.   Skin: Warm, Dry, No erythema, No rash.   Back: No tenderness, No CVA tenderness.   Extremities: Intact distal pulses, No edema, No tenderness, No cyanosis, No clubbing.   Neurologic: Alert & oriented x 3, Normal motor function, Normal sensory function, No focal deficits noted.   Psychiatric: Affect normal, Judgment normal, Mood normal.     COURSE & MEDICAL DECISION MAKING  Pertinent Labs & Imaging studies reviewed. (See chart for details)  This a 66-year-old male who presents the emergency department with facial swelling.  I did review his CT scan that did show evidence of proctitis.  The patient finished his course of antibiotics and still has swelling.  The patient will not allow me to repeat laboratory analysis nor the CT scan.  He is adamant that he does not want to have an IV.  He is aware that I cannot further rule out an abscess.  In further speak with the patient he states the swelling seems to be resolving and he just wants an ENT referral.  Unfortunately we do not have ENT on call here today.  The patient did attempt to get in with Dr. Hopper after his last visit and he is unable to get an appointment.  The patient states that he will follow up with his primary care doctor for ENT referral.  I will start the patient on 3 days of prednisone for the swelling.  He is instructed to go to [facility] on [facility] if he changes his mind regarding work-up and treatment as ENT is on-call at that facility.  The patient does not appear toxic.  He is protecting his airway at this time.  He is hypertensive and he  states it is common as he gets anxious when he is at the doctor's office.  He is aware this needs to be rechecked within 10 to 14 days.    FINAL IMPRESSION  1.  Left parotitis    Disposition  The patient will be discharged in stable condition      Electronically signed by: Fransico Rosales M.D., 11/30/2021 3:42 PM

## 2021-11-30 NOTE — DISCHARGE INSTRUCTIONS
Follow-up with your primary care physician as soon as possible for ENT referral.  Go to [facility] on [facility] if you change your mind regarding work-up and emergency department treatment.  Your blood pressure is elevated here in the emergency department and need to follow-up with your primary care doctor within 2 weeks for repeat blood pressure check.

## 2021-11-30 NOTE — ED TRIAGE NOTES
"Chief Complaint   Patient presents with   • Neck Pain     Pt states was treated last week for a \"swollen salivary gland\", continues to have pain     BP (!) 203/121 Comment: \"white coat syndrome\"  Pulse 83   Temp 36.4 °C (97.5 °F) (Temporal)   Resp 17   Ht 1.778 m (5' 10\")   Wt 92.2 kg (203 lb 4.2 oz)   SpO2 97%   BMI 29.17 kg/m²     Has this patient been vaccinated for COVID YES  If not, would they like to be vaccinated while in the ER if eligible?  NA  Would the patient like to speak with the ERP about the possibility of receiving the COVID vaccine today before making a decision? NA      "

## 2022-04-25 ENCOUNTER — PRE-ADMISSION TESTING (OUTPATIENT)
Dept: ADMISSIONS | Facility: MEDICAL CENTER | Age: 67
DRG: 039 | End: 2022-04-25
Attending: SURGERY
Payer: MEDICARE

## 2022-04-25 DIAGNOSIS — Z01.812 PRE-OPERATIVE LABORATORY EXAMINATION: ICD-10-CM

## 2022-04-25 DIAGNOSIS — Z01.810 PRE-OPERATIVE CARDIOVASCULAR EXAMINATION: ICD-10-CM

## 2022-04-25 LAB
ABO GROUP BLD: NORMAL
ANION GAP SERPL CALC-SCNC: 17 MMOL/L (ref 7–16)
BLD GP AB SCN SERPL QL: NORMAL
BUN SERPL-MCNC: 15 MG/DL (ref 8–22)
CALCIUM SERPL-MCNC: 9.3 MG/DL (ref 8.5–10.5)
CHLORIDE SERPL-SCNC: 102 MMOL/L (ref 96–112)
CO2 SERPL-SCNC: 23 MMOL/L (ref 20–33)
CREAT SERPL-MCNC: 0.9 MG/DL (ref 0.5–1.4)
EKG IMPRESSION: NORMAL
ERYTHROCYTE [DISTWIDTH] IN BLOOD BY AUTOMATED COUNT: 45.5 FL (ref 35.9–50)
EST. AVERAGE GLUCOSE BLD GHB EST-MCNC: 163 MG/DL
GFR SERPLBLD CREATININE-BSD FMLA CKD-EPI: 94 ML/MIN/1.73 M 2
GLUCOSE SERPL-MCNC: 190 MG/DL (ref 65–99)
HBA1C MFR BLD: 7.3 % (ref 4–5.6)
HCT VFR BLD AUTO: 46.5 % (ref 42–52)
HGB BLD-MCNC: 15.3 G/DL (ref 14–18)
MCH RBC QN AUTO: 30.5 PG (ref 27–33)
MCHC RBC AUTO-ENTMCNC: 32.9 G/DL (ref 33.7–35.3)
MCV RBC AUTO: 92.6 FL (ref 81.4–97.8)
PLATELET # BLD AUTO: 217 K/UL (ref 164–446)
PMV BLD AUTO: 10.9 FL (ref 9–12.9)
POTASSIUM SERPL-SCNC: 4.6 MMOL/L (ref 3.6–5.5)
RBC # BLD AUTO: 5.02 M/UL (ref 4.7–6.1)
RH BLD: NORMAL
SODIUM SERPL-SCNC: 142 MMOL/L (ref 135–145)
WBC # BLD AUTO: 9 K/UL (ref 4.8–10.8)

## 2022-04-25 PROCEDURE — 86900 BLOOD TYPING SEROLOGIC ABO: CPT

## 2022-04-25 PROCEDURE — 93005 ELECTROCARDIOGRAM TRACING: CPT

## 2022-04-25 PROCEDURE — 80048 BASIC METABOLIC PNL TOTAL CA: CPT

## 2022-04-25 PROCEDURE — 86901 BLOOD TYPING SEROLOGIC RH(D): CPT

## 2022-04-25 PROCEDURE — 83036 HEMOGLOBIN GLYCOSYLATED A1C: CPT

## 2022-04-25 PROCEDURE — 86850 RBC ANTIBODY SCREEN: CPT

## 2022-04-25 PROCEDURE — 85027 COMPLETE CBC AUTOMATED: CPT

## 2022-04-25 PROCEDURE — 93010 ELECTROCARDIOGRAM REPORT: CPT | Performed by: INTERNAL MEDICINE

## 2022-04-25 PROCEDURE — 36415 COLL VENOUS BLD VENIPUNCTURE: CPT

## 2022-04-25 RX ORDER — ASPIRIN 81 MG/1
81 TABLET, CHEWABLE ORAL DAILY
COMMUNITY
End: 2022-12-21 | Stop reason: SDUPTHER

## 2022-04-25 ASSESSMENT — FIBROSIS 4 INDEX: FIB4 SCORE: 1.2

## 2022-04-25 NOTE — PREPROCEDURE INSTRUCTIONS
Patient here for preadmit appointment.  Patient instructed to stop vitamins/supplements/herbal medications/diet pills and given fasting instructions per anesthesia guidelines.  Patient following insulin and aspirin instructions per MD.  Patient instructed to hold Synjardy 3 days prior to procedure and hold losartan morning of surgery per anesthesia guidelines. Patient understands all instructions and denies questions at this time.

## 2022-05-03 NOTE — OR NURSING
COVID-19 Pre-surgery screening:    Left message for Pt to call back if experiencing Covid symptoms, and advised of mask/visitor policies.

## 2022-05-04 ENCOUNTER — HOSPITAL ENCOUNTER (INPATIENT)
Facility: MEDICAL CENTER | Age: 67
LOS: 1 days | DRG: 039 | End: 2022-05-05
Attending: SURGERY | Admitting: SURGERY
Payer: MEDICARE

## 2022-05-04 ENCOUNTER — ANESTHESIA (OUTPATIENT)
Dept: SURGERY | Facility: MEDICAL CENTER | Age: 67
DRG: 039 | End: 2022-05-04
Payer: MEDICARE

## 2022-05-04 ENCOUNTER — ANESTHESIA EVENT (OUTPATIENT)
Dept: SURGERY | Facility: MEDICAL CENTER | Age: 67
DRG: 039 | End: 2022-05-04
Payer: MEDICARE

## 2022-05-04 DIAGNOSIS — G89.18 POST-OP PAIN: Primary | ICD-10-CM

## 2022-05-04 LAB
ABO + RH BLD: NORMAL
GLUCOSE BLD STRIP.AUTO-MCNC: 161 MG/DL (ref 65–99)
GLUCOSE BLD STRIP.AUTO-MCNC: 170 MG/DL (ref 65–99)
GLUCOSE BLD STRIP.AUTO-MCNC: 185 MG/DL (ref 65–99)
GLUCOSE BLD STRIP.AUTO-MCNC: 211 MG/DL (ref 65–99)

## 2022-05-04 PROCEDURE — 160039 HCHG SURGERY MINUTES - EA ADDL 1 MIN LEVEL 3: Performed by: SURGERY

## 2022-05-04 PROCEDURE — 700102 HCHG RX REV CODE 250 W/ 637 OVERRIDE(OP): Performed by: SURGERY

## 2022-05-04 PROCEDURE — 160028 HCHG SURGERY MINUTES - 1ST 30 MINS LEVEL 3: Performed by: SURGERY

## 2022-05-04 PROCEDURE — 95938 SOMATOSENSORY TESTING: CPT | Performed by: SURGERY

## 2022-05-04 PROCEDURE — 700101 HCHG RX REV CODE 250: Performed by: ANESTHESIOLOGY

## 2022-05-04 PROCEDURE — 160035 HCHG PACU - 1ST 60 MINS PHASE I: Performed by: SURGERY

## 2022-05-04 PROCEDURE — 700102 HCHG RX REV CODE 250 W/ 637 OVERRIDE(OP): Performed by: NURSE PRACTITIONER

## 2022-05-04 PROCEDURE — 500368 HCHG DRAIN, 7MM FLAT-FLUTED: Performed by: SURGERY

## 2022-05-04 PROCEDURE — 700105 HCHG RX REV CODE 258: Performed by: SURGERY

## 2022-05-04 PROCEDURE — 700111 HCHG RX REV CODE 636 W/ 250 OVERRIDE (IP): Performed by: SURGERY

## 2022-05-04 PROCEDURE — A9270 NON-COVERED ITEM OR SERVICE: HCPCS | Performed by: ANESTHESIOLOGY

## 2022-05-04 PROCEDURE — 4A11X4G MONITORING OF PERIPHERAL NERVOUS ELECTRICAL ACTIVITY, INTRAOPERATIVE, EXTERNAL APPROACH: ICD-10-PCS | Performed by: SURGERY

## 2022-05-04 PROCEDURE — 160036 HCHG PACU - EA ADDL 30 MINS PHASE I: Performed by: SURGERY

## 2022-05-04 PROCEDURE — 160009 HCHG ANES TIME/MIN: Performed by: SURGERY

## 2022-05-04 PROCEDURE — 03CL0ZZ EXTIRPATION OF MATTER FROM LEFT INTERNAL CAROTID ARTERY, OPEN APPROACH: ICD-10-PCS | Performed by: SURGERY

## 2022-05-04 PROCEDURE — 95940 IONM IN OPERATNG ROOM 15 MIN: CPT | Performed by: SURGERY

## 2022-05-04 PROCEDURE — 501838 HCHG SUTURE GENERAL: Performed by: SURGERY

## 2022-05-04 PROCEDURE — 770001 HCHG ROOM/CARE - MED/SURG/GYN PRIV*

## 2022-05-04 PROCEDURE — 82962 GLUCOSE BLOOD TEST: CPT | Mod: 91

## 2022-05-04 PROCEDURE — 160048 HCHG OR STATISTICAL LEVEL 1-5: Performed by: SURGERY

## 2022-05-04 PROCEDURE — C1725 CATH, TRANSLUMIN NON-LASER: HCPCS | Performed by: SURGERY

## 2022-05-04 PROCEDURE — 03UL0KZ SUPPLEMENT LEFT INTERNAL CAROTID ARTERY WITH NONAUTOLOGOUS TISSUE SUBSTITUTE, OPEN APPROACH: ICD-10-PCS | Performed by: SURGERY

## 2022-05-04 PROCEDURE — 110454 HCHG SHELL REV 250: Performed by: SURGERY

## 2022-05-04 PROCEDURE — 700102 HCHG RX REV CODE 250 W/ 637 OVERRIDE(OP): Performed by: ANESTHESIOLOGY

## 2022-05-04 PROCEDURE — C1781 MESH (IMPLANTABLE): HCPCS | Performed by: SURGERY

## 2022-05-04 PROCEDURE — 700111 HCHG RX REV CODE 636 W/ 250 OVERRIDE (IP): Performed by: ANESTHESIOLOGY

## 2022-05-04 PROCEDURE — 95955 EEG DURING SURGERY: CPT | Performed by: SURGERY

## 2022-05-04 PROCEDURE — 160002 HCHG RECOVERY MINUTES (STAT): Performed by: SURGERY

## 2022-05-04 PROCEDURE — 36415 COLL VENOUS BLD VENIPUNCTURE: CPT

## 2022-05-04 PROCEDURE — 700101 HCHG RX REV CODE 250: Performed by: SURGERY

## 2022-05-04 PROCEDURE — 500257: Performed by: SURGERY

## 2022-05-04 PROCEDURE — A9270 NON-COVERED ITEM OR SERVICE: HCPCS | Performed by: NURSE PRACTITIONER

## 2022-05-04 PROCEDURE — A9270 NON-COVERED ITEM OR SERVICE: HCPCS | Performed by: SURGERY

## 2022-05-04 PROCEDURE — 501837 HCHG SUTURE CV: Performed by: SURGERY

## 2022-05-04 PROCEDURE — 00350 ANES PX MAJOR VSL NECK NOS: CPT | Performed by: ANESTHESIOLOGY

## 2022-05-04 DEVICE — PATCH .8X8CM XENOSURE BIOLOGIC VASCULAR---ORDER IN MULTIPLES OF 5---: Type: IMPLANTABLE DEVICE | Status: FUNCTIONAL

## 2022-05-04 RX ORDER — ONDANSETRON 2 MG/ML
INJECTION INTRAMUSCULAR; INTRAVENOUS PRN
Status: DISCONTINUED | OUTPATIENT
Start: 2022-05-04 | End: 2022-05-04 | Stop reason: SURG

## 2022-05-04 RX ORDER — PROTAMINE SULFATE 10 MG/ML
INJECTION, SOLUTION INTRAVENOUS PRN
Status: DISCONTINUED | OUTPATIENT
Start: 2022-05-04 | End: 2022-05-04 | Stop reason: SURG

## 2022-05-04 RX ORDER — HYDROCODONE BITARTRATE AND ACETAMINOPHEN 5; 325 MG/1; MG/1
1-2 TABLET ORAL EVERY 6 HOURS PRN
Status: DISCONTINUED | OUTPATIENT
Start: 2022-05-04 | End: 2022-05-05 | Stop reason: HOSPADM

## 2022-05-04 RX ORDER — HEPARIN SODIUM,PORCINE 1000/ML
VIAL (ML) INJECTION PRN
Status: DISCONTINUED | OUTPATIENT
Start: 2022-05-04 | End: 2022-05-04 | Stop reason: SURG

## 2022-05-04 RX ORDER — DEXTROSE MONOHYDRATE 25 G/50ML
25 INJECTION, SOLUTION INTRAVENOUS
Status: DISCONTINUED | OUTPATIENT
Start: 2022-05-04 | End: 2022-05-05 | Stop reason: HOSPADM

## 2022-05-04 RX ORDER — LOSARTAN POTASSIUM 50 MG/1
100 TABLET ORAL DAILY
Status: DISCONTINUED | OUTPATIENT
Start: 2022-05-04 | End: 2022-05-05 | Stop reason: HOSPADM

## 2022-05-04 RX ORDER — SODIUM CHLORIDE 9 MG/ML
INJECTION, SOLUTION INTRAVENOUS CONTINUOUS
Status: ACTIVE | OUTPATIENT
Start: 2022-05-04 | End: 2022-05-04

## 2022-05-04 RX ORDER — LIDOCAINE HYDROCHLORIDE 20 MG/ML
INJECTION, SOLUTION EPIDURAL; INFILTRATION; INTRACAUDAL; PERINEURAL PRN
Status: DISCONTINUED | OUTPATIENT
Start: 2022-05-04 | End: 2022-05-04 | Stop reason: SURG

## 2022-05-04 RX ORDER — SCOLOPAMINE TRANSDERMAL SYSTEM 1 MG/1
1 PATCH, EXTENDED RELEASE TRANSDERMAL
Status: DISCONTINUED | OUTPATIENT
Start: 2022-05-04 | End: 2022-05-05 | Stop reason: HOSPADM

## 2022-05-04 RX ORDER — HYDROCODONE BITARTRATE AND ACETAMINOPHEN 5; 325 MG/1; MG/1
1-2 TABLET ORAL EVERY 4 HOURS PRN
Qty: 10 TABLET | Refills: 0 | Status: SHIPPED | OUTPATIENT
Start: 2022-05-04 | End: 2022-05-06

## 2022-05-04 RX ORDER — SODIUM CHLORIDE, SODIUM LACTATE, POTASSIUM CHLORIDE, CALCIUM CHLORIDE 600; 310; 30; 20 MG/100ML; MG/100ML; MG/100ML; MG/100ML
INJECTION, SOLUTION INTRAVENOUS CONTINUOUS
Status: ACTIVE | OUTPATIENT
Start: 2022-05-04 | End: 2022-05-04

## 2022-05-04 RX ORDER — METOCLOPRAMIDE HYDROCHLORIDE 5 MG/ML
INJECTION INTRAMUSCULAR; INTRAVENOUS PRN
Status: DISCONTINUED | OUTPATIENT
Start: 2022-05-04 | End: 2022-05-04 | Stop reason: SURG

## 2022-05-04 RX ORDER — DEXAMETHASONE SODIUM PHOSPHATE 4 MG/ML
4 INJECTION, SOLUTION INTRA-ARTICULAR; INTRALESIONAL; INTRAMUSCULAR; INTRAVENOUS; SOFT TISSUE
Status: DISCONTINUED | OUTPATIENT
Start: 2022-05-04 | End: 2022-05-05 | Stop reason: HOSPADM

## 2022-05-04 RX ORDER — LABETALOL HYDROCHLORIDE 5 MG/ML
10 INJECTION, SOLUTION INTRAVENOUS EVERY 4 HOURS PRN
Status: DISCONTINUED | OUTPATIENT
Start: 2022-05-04 | End: 2022-05-05 | Stop reason: HOSPADM

## 2022-05-04 RX ORDER — ASPIRIN 81 MG/1
81 TABLET, CHEWABLE ORAL DAILY
Status: DISCONTINUED | OUTPATIENT
Start: 2022-05-04 | End: 2022-05-05 | Stop reason: HOSPADM

## 2022-05-04 RX ORDER — METOPROLOL TARTRATE 1 MG/ML
INJECTION, SOLUTION INTRAVENOUS PRN
Status: DISCONTINUED | OUTPATIENT
Start: 2022-05-04 | End: 2022-05-04 | Stop reason: SURG

## 2022-05-04 RX ORDER — CLONIDINE HYDROCHLORIDE 0.1 MG/1
0.1 TABLET ORAL
Status: DISCONTINUED | OUTPATIENT
Start: 2022-05-04 | End: 2022-05-05 | Stop reason: HOSPADM

## 2022-05-04 RX ORDER — HYDRALAZINE HYDROCHLORIDE 20 MG/ML
INJECTION INTRAMUSCULAR; INTRAVENOUS PRN
Status: DISCONTINUED | OUTPATIENT
Start: 2022-05-04 | End: 2022-05-04 | Stop reason: SURG

## 2022-05-04 RX ORDER — CEFAZOLIN SODIUM 1 G/3ML
INJECTION, POWDER, FOR SOLUTION INTRAMUSCULAR; INTRAVENOUS PRN
Status: DISCONTINUED | OUTPATIENT
Start: 2022-05-04 | End: 2022-05-04 | Stop reason: SURG

## 2022-05-04 RX ORDER — HYDROCODONE BITARTRATE AND ACETAMINOPHEN 5; 325 MG/1; MG/1
1-2 TABLET ORAL EVERY 4 HOURS PRN
Qty: 10 TABLET | Refills: 0 | Status: CANCELLED | OUTPATIENT
Start: 2022-05-04 | End: 2022-05-06

## 2022-05-04 RX ORDER — DIPHENHYDRAMINE HYDROCHLORIDE 50 MG/ML
12.5 INJECTION INTRAMUSCULAR; INTRAVENOUS
Status: DISCONTINUED | OUTPATIENT
Start: 2022-05-04 | End: 2022-05-04 | Stop reason: HOSPADM

## 2022-05-04 RX ORDER — GABAPENTIN 100 MG/1
100 CAPSULE ORAL 4 TIMES DAILY
Status: DISCONTINUED | OUTPATIENT
Start: 2022-05-04 | End: 2022-05-05 | Stop reason: HOSPADM

## 2022-05-04 RX ORDER — ONDANSETRON 2 MG/ML
4 INJECTION INTRAMUSCULAR; INTRAVENOUS
Status: DISCONTINUED | OUTPATIENT
Start: 2022-05-04 | End: 2022-05-04 | Stop reason: HOSPADM

## 2022-05-04 RX ORDER — BUPIVACAINE HYDROCHLORIDE AND EPINEPHRINE 5; 5 MG/ML; UG/ML
INJECTION, SOLUTION EPIDURAL; INTRACAUDAL; PERINEURAL
Status: DISCONTINUED | OUTPATIENT
Start: 2022-05-04 | End: 2022-05-04 | Stop reason: HOSPADM

## 2022-05-04 RX ORDER — HALOPERIDOL 5 MG/ML
1 INJECTION INTRAMUSCULAR EVERY 6 HOURS PRN
Status: DISCONTINUED | OUTPATIENT
Start: 2022-05-04 | End: 2022-05-05 | Stop reason: HOSPADM

## 2022-05-04 RX ORDER — ONDANSETRON 2 MG/ML
4 INJECTION INTRAMUSCULAR; INTRAVENOUS EVERY 4 HOURS PRN
Status: DISCONTINUED | OUTPATIENT
Start: 2022-05-04 | End: 2022-05-05 | Stop reason: HOSPADM

## 2022-05-04 RX ORDER — LABETALOL HYDROCHLORIDE 5 MG/ML
INJECTION, SOLUTION INTRAVENOUS PRN
Status: DISCONTINUED | OUTPATIENT
Start: 2022-05-04 | End: 2022-05-04 | Stop reason: SURG

## 2022-05-04 RX ORDER — HYDRALAZINE HYDROCHLORIDE 20 MG/ML
10 INJECTION INTRAMUSCULAR; INTRAVENOUS EVERY 4 HOURS PRN
Status: DISCONTINUED | OUTPATIENT
Start: 2022-05-04 | End: 2022-05-05 | Stop reason: HOSPADM

## 2022-05-04 RX ORDER — HALOPERIDOL 5 MG/ML
1 INJECTION INTRAMUSCULAR
Status: DISCONTINUED | OUTPATIENT
Start: 2022-05-04 | End: 2022-05-04 | Stop reason: HOSPADM

## 2022-05-04 RX ORDER — DIPHENHYDRAMINE HYDROCHLORIDE 50 MG/ML
25 INJECTION INTRAMUSCULAR; INTRAVENOUS EVERY 6 HOURS PRN
Status: DISCONTINUED | OUTPATIENT
Start: 2022-05-04 | End: 2022-05-05 | Stop reason: HOSPADM

## 2022-05-04 RX ORDER — CLONIDINE HYDROCHLORIDE 0.1 MG/1
0.2 TABLET ORAL
Status: COMPLETED | OUTPATIENT
Start: 2022-05-04 | End: 2022-05-04

## 2022-05-04 RX ORDER — ROSUVASTATIN CALCIUM 5 MG/1
5 TABLET, COATED ORAL DAILY
Status: DISCONTINUED | OUTPATIENT
Start: 2022-05-04 | End: 2022-05-05 | Stop reason: HOSPADM

## 2022-05-04 RX ADMIN — SODIUM CHLORIDE, POTASSIUM CHLORIDE, SODIUM LACTATE AND CALCIUM CHLORIDE: 600; 310; 30; 20 INJECTION, SOLUTION INTRAVENOUS at 07:48

## 2022-05-04 RX ADMIN — LABETALOL HYDROCHLORIDE 20 MG: 5 INJECTION, SOLUTION INTRAVENOUS at 10:14

## 2022-05-04 RX ADMIN — FENTANYL CITRATE 100 MCG: 50 INJECTION, SOLUTION INTRAMUSCULAR; INTRAVENOUS at 08:41

## 2022-05-04 RX ADMIN — METOPROLOL TARTRATE 2.5 MG: 5 INJECTION, SOLUTION INTRAVENOUS at 10:03

## 2022-05-04 RX ADMIN — CLONIDINE HYDROCHLORIDE 0.2 MG: 0.1 TABLET ORAL at 19:29

## 2022-05-04 RX ADMIN — FENTANYL CITRATE 50 MCG: 50 INJECTION, SOLUTION INTRAMUSCULAR; INTRAVENOUS at 12:25

## 2022-05-04 RX ADMIN — FENTANYL CITRATE 50 MCG: 50 INJECTION, SOLUTION INTRAMUSCULAR; INTRAVENOUS at 10:54

## 2022-05-04 RX ADMIN — GABAPENTIN 100 MG: 100 CAPSULE ORAL at 18:21

## 2022-05-04 RX ADMIN — LIDOCAINE HYDROCHLORIDE 0.5 ML: 10 INJECTION, SOLUTION EPIDURAL; INFILTRATION; INTRACAUDAL; PERINEURAL at 07:28

## 2022-05-04 RX ADMIN — ONDANSETRON 4 MG: 2 INJECTION INTRAMUSCULAR; INTRAVENOUS at 10:08

## 2022-05-04 RX ADMIN — ROSUVASTATIN CALCIUM 5 MG: 5 TABLET, FILM COATED ORAL at 15:07

## 2022-05-04 RX ADMIN — HYDRALAZINE HYDROCHLORIDE 10 MG: 20 INJECTION INTRAMUSCULAR; INTRAVENOUS at 10:11

## 2022-05-04 RX ADMIN — ASPIRIN 81 MG 81 MG: 81 TABLET ORAL at 15:07

## 2022-05-04 RX ADMIN — METOCLOPRAMIDE 20 MG: 5 INJECTION, SOLUTION INTRAMUSCULAR; INTRAVENOUS at 10:06

## 2022-05-04 RX ADMIN — FENTANYL CITRATE 50 MCG: 50 INJECTION, SOLUTION INTRAMUSCULAR; INTRAVENOUS at 10:59

## 2022-05-04 RX ADMIN — FENTANYL CITRATE 50 MCG: 50 INJECTION, SOLUTION INTRAMUSCULAR; INTRAVENOUS at 11:38

## 2022-05-04 RX ADMIN — HYDRALAZINE HYDROCHLORIDE 10 MG: 20 INJECTION INTRAMUSCULAR; INTRAVENOUS at 10:13

## 2022-05-04 RX ADMIN — LOSARTAN POTASSIUM 100 MG: 50 TABLET, FILM COATED ORAL at 15:07

## 2022-05-04 RX ADMIN — METOPROLOL TARTRATE 5 MG: 5 INJECTION, SOLUTION INTRAVENOUS at 10:11

## 2022-05-04 RX ADMIN — INSULIN HUMAN 2 UNITS: 100 INJECTION, SOLUTION PARENTERAL at 18:45

## 2022-05-04 RX ADMIN — HYDROCODONE BITARTRATE AND ACETAMINOPHEN 1 TABLET: 5; 325 TABLET ORAL at 19:24

## 2022-05-04 RX ADMIN — LIDOCAINE HYDROCHLORIDE 100 MG: 20 INJECTION, SOLUTION EPIDURAL; INFILTRATION; INTRACAUDAL at 08:34

## 2022-05-04 RX ADMIN — INSULIN HUMAN 1 UNITS: 100 INJECTION, SOLUTION PARENTERAL at 21:58

## 2022-05-04 RX ADMIN — ROCURONIUM BROMIDE 50 MG: 10 INJECTION, SOLUTION INTRAVENOUS at 08:34

## 2022-05-04 RX ADMIN — GABAPENTIN 100 MG: 100 CAPSULE ORAL at 21:56

## 2022-05-04 RX ADMIN — FENTANYL CITRATE 100 MCG: 50 INJECTION, SOLUTION INTRAMUSCULAR; INTRAVENOUS at 10:15

## 2022-05-04 RX ADMIN — SODIUM CHLORIDE, POTASSIUM CHLORIDE, SODIUM LACTATE AND CALCIUM CHLORIDE: 600; 310; 30; 20 INJECTION, SOLUTION INTRAVENOUS at 08:28

## 2022-05-04 RX ADMIN — PROTAMINE SULFATE 50 MG: 10 INJECTION, SOLUTION INTRAVENOUS at 09:56

## 2022-05-04 RX ADMIN — PROTAMINE SULFATE 25 MG: 10 INJECTION, SOLUTION INTRAVENOUS at 09:59

## 2022-05-04 RX ADMIN — HYDROCODONE BITARTRATE AND ACETAMINOPHEN 15 MG: 7.5; 325 SOLUTION ORAL at 10:57

## 2022-05-04 RX ADMIN — HEPARIN SODIUM 9000 UNITS: 1000 INJECTION, SOLUTION INTRAVENOUS; SUBCUTANEOUS at 09:26

## 2022-05-04 RX ADMIN — CEFAZOLIN 2 G: 330 INJECTION, POWDER, FOR SOLUTION INTRAMUSCULAR; INTRAVENOUS at 08:29

## 2022-05-04 RX ADMIN — ROCURONIUM BROMIDE 30 MG: 10 INJECTION, SOLUTION INTRAVENOUS at 09:30

## 2022-05-04 RX ADMIN — SUGAMMADEX 200 MG: 100 INJECTION, SOLUTION INTRAVENOUS at 10:17

## 2022-05-04 RX ADMIN — FENTANYL CITRATE 100 MCG: 50 INJECTION, SOLUTION INTRAMUSCULAR; INTRAVENOUS at 09:00

## 2022-05-04 RX ADMIN — METOPROLOL TARTRATE 2.5 MG: 5 INJECTION, SOLUTION INTRAVENOUS at 10:01

## 2022-05-04 RX ADMIN — PROPOFOL 200 MG: 10 INJECTION, EMULSION INTRAVENOUS at 08:34

## 2022-05-04 RX ADMIN — HALOPERIDOL LACTATE 1 MG: 5 INJECTION, SOLUTION INTRAMUSCULAR at 12:05

## 2022-05-04 ASSESSMENT — COGNITIVE AND FUNCTIONAL STATUS - GENERAL
DRESSING REGULAR UPPER BODY CLOTHING: A LITTLE
WALKING IN HOSPITAL ROOM: A LITTLE
CLIMB 3 TO 5 STEPS WITH RAILING: A LITTLE
PERSONAL GROOMING: A LITTLE
MOBILITY SCORE: 18
TOILETING: A LITTLE
STANDING UP FROM CHAIR USING ARMS: A LITTLE
DAILY ACTIVITIY SCORE: 18
MOVING TO AND FROM BED TO CHAIR: A LITTLE
HELP NEEDED FOR BATHING: A LITTLE
EATING MEALS: A LITTLE
TURNING FROM BACK TO SIDE WHILE IN FLAT BAD: A LITTLE
SUGGESTED CMS G CODE MODIFIER DAILY ACTIVITY: CK
DRESSING REGULAR LOWER BODY CLOTHING: A LITTLE
SUGGESTED CMS G CODE MODIFIER MOBILITY: CK
MOVING FROM LYING ON BACK TO SITTING ON SIDE OF FLAT BED: A LITTLE

## 2022-05-04 ASSESSMENT — PAIN DESCRIPTION - PAIN TYPE
TYPE: SURGICAL PAIN

## 2022-05-04 ASSESSMENT — PATIENT HEALTH QUESTIONNAIRE - PHQ9
2. FEELING DOWN, DEPRESSED, IRRITABLE, OR HOPELESS: NOT AT ALL
SUM OF ALL RESPONSES TO PHQ9 QUESTIONS 1 AND 2: 0
1. LITTLE INTEREST OR PLEASURE IN DOING THINGS: NOT AT ALL

## 2022-05-04 ASSESSMENT — LIFESTYLE VARIABLES
ALCOHOL_USE: YES
HAVE PEOPLE ANNOYED YOU BY CRITICIZING YOUR DRINKING: NO
TOTAL SCORE: 0
EVER FELT BAD OR GUILTY ABOUT YOUR DRINKING: NO
DOES PATIENT WANT TO STOP DRINKING: NO
HAVE YOU EVER FELT YOU SHOULD CUT DOWN ON YOUR DRINKING: NO
AVERAGE NUMBER OF DAYS PER WEEK YOU HAVE A DRINK CONTAINING ALCOHOL: 2
TOTAL SCORE: 0
CONSUMPTION TOTAL: POSITIVE
TOTAL SCORE: 0
ON A TYPICAL DAY WHEN YOU DRINK ALCOHOL HOW MANY DRINKS DO YOU HAVE: 2
EVER HAD A DRINK FIRST THING IN THE MORNING TO STEADY YOUR NERVES TO GET RID OF A HANGOVER: NO
HOW MANY TIMES IN THE PAST YEAR HAVE YOU HAD 5 OR MORE DRINKS IN A DAY: 2

## 2022-05-04 ASSESSMENT — FIBROSIS 4 INDEX: FIB4 SCORE: 1.37

## 2022-05-04 NOTE — PROGRESS NOTES
Report received from PACU RN.  Assessment complete.  A&O x 4. Patient calls appropriately.  Patient up with SB assist.  Q4hour neuro check.   Patient has 5/10 pain.   Denies N&V. Tolerating fluids.  Surgical dressings to neck CDI, LYNN drain to neck with serosang output.  + void, PTA BM.  Patient denies SOB.  SCD's on.  Review plan with of care with patient. Call light and personal belongings with in reach. Hourly rounding in place. All needs met at this time.

## 2022-05-04 NOTE — ANESTHESIA PREPROCEDURE EVALUATION
Case: [id number] Date/Time: 05/04/22 0815    Procedures:       ENDARTERECTOMY, CAROTID (Left )      EEG (ELECTROENCEPHALOGRAM)    Pre-op diagnosis: CAROTID ARTERY STENOSIS    Location: [facility] OR 03 / SURGERY [facility]    Surgeons: Armando Harmon M.D.          Relevant Problems   CARDIAC   (positive) Hypertension      ENDO   (positive) Diabetes mellitus, type II (HCC)      Other   (positive) Hyperlipemia       Physical Exam    Airway   Mallampati: IV  TM distance: <3 FB  Neck ROM: full       Cardiovascular - normal exam  Rhythm: regular  Rate: normal  (-) murmur     Dental - normal exam        Facial Hair   Pulmonary - normal exam  Breath sounds clear to auscultation     Abdominal    Neurological - normal exam                 Anesthesia Plan    ASA 3       Plan - general       Airway plan will be ETT          Induction: intravenous      Pertinent diagnostic labs and testing reviewed    Informed Consent:    Anesthetic plan and risks discussed with patient.

## 2022-05-04 NOTE — ANESTHESIA PROCEDURE NOTES
Arterial Line  Performed by: Greg Allison M.D.  Authorized by: Greg Allisno M.D.     Start Time:  5/4/2022 8:38 AM  End Time:  5/4/2022 8:40 AM  Localization: surface landmarks    Patient Location:  OR  Indication: continuous blood pressure monitoring and blood sampling needed        Catheter Size:  20 G  Seldinger Technique?: Yes    Laterality:  Right  Site:  Radial artery  Line Secured:  Transparent dressing, tape and antimicrobial disc  Events: patient tolerated procedure well with no complications

## 2022-05-04 NOTE — OR NURSING
1026: Pt arrived from OR, handoff received from anesthesiologist and RN. Dressing to L neck with gauze and medipore tape C/D/I. LYNN secured under dressing with bloody drainage.     1057: Patient medicated with oral pain meds per anesthesia orders.     1110: Call made to patient's friend to update patient status. No answer at number provided, will reattempt.    1326: Handoff to ELVIRA Justice on T4.

## 2022-05-04 NOTE — OP REPORT
-------------------------------------------------    Vascular Surgery Operative Note  --------------------------------------------    Date of Service: 5/4/2022    Patient Name:  Dixon Mckeon MRN:  [id number]    --------------------------------------------------------------------------------------------------    Preoperative Diagnosis:  - High-grade asymptomatic left carotid stenosis    Postoperative Diagnosis:  Same    Procedure:  1) left carotid endarterectomy with neuromonitoring    -------------------------------------------------------------------------------------------------    Surgeon:   Armando Harmon MD    Assistant:   Natasha BRODERICK    Anesthesia:   GETA    EBL:    15 cc    Heparin:   Systemically heparinized, 9000 units    Specimen:   None sent    Complications:  None    Disposition:   PACU in stable condition    Findings:   Bulky smooth plaque    Justification for use of Surgical First Assist:  An experienced first assistant was utilized during this operation due to the complexity of the operation.  My assistant participated with patient preparation for surgery, incision, surgical exposure including retraction, dissection, and ligation to isolate the target structures and preserve nearby structures, and closure of the field of dissection.  The presence of the expert assist increased the efficiency of the operation and decreased the risk of intraoperative surgical complications.    -----------------------------------------------------------------------------------------------------    History:  Dixon Chung is a 66 y.o. male with high-grade asymptomatic left carotid stenosis.  Left carotid endarterectomy was recommended.  I had a thorough, detailed discussion with the patient regarding the severity of his carotid disease and that he is at high risk of stroke.  I explained the primary purpose of this operation is to prevent stroke from the plaque in the carotid artery.  I  explained the details of the operation including neuromonitoring and possible use of a shunt.  I discussed the alternatives of optimal medical management or stenting, although carotid endarterectomy is preferable in someone who is acceptable risk for surgery.  I discussed the potential risks, including but not limited to bleeding, infection, injury to vessels or nerves, and risks of anesthesia. I explained the risk that the operation itself can cause a stroke, although the risk of stroke from the operation is less than the risk of stroke if the plaque is not treated, and thus carotid endarterectomy is recommended.  All of their questions were answered. Patient understands and agrees to proceed.    Procedure Summary:  Following informed consent, patient was brought to the OR where general anesthesia was administered. Patient was positioned, neuromonitoring was put in place, and the left neck was prepped and draped in the usual sterile fashion.  Timeout was called to identify the correct patient, team and equipment.  Everyone was in agreement.  Procedure began with injection of local anesthetic along the left SCM and then a longitudinal incision was made and carried down through each layer using cautery to assure hemostasis.  The common facial vein was identified and ligated.  The common, external and internal carotid were identified and dissected circumferentially proximally and distally and encircled with loops.    Patient was systemically heparinized and then the artery was clamped, with the ICA being applied first.  The carotid was opened with a longitudinal arteriotomy and a bulky smooth plaque was seen.  There were no changes on EEG monitoring. Endarterectomy of the plaque was performed and the endpoints feathered nicely.  Once complete, a bovine pericardial patch was sutured with a running 6-0 prolene suture.  The artery was flushed and copiously irrigated and the suture line was completed.  The clamps were  removed, with the ICA being removed last, and flow was restored.  There was a continuous doppler flow signal in the ICA at this point with no evidence of stenosis.  The heparin was reversed with protamine. Topical hemostatic was applied.  A 7 flat LYNN drain was placed and secured with a nylon suture.     At this point we had good hemostasis.  The platysma was reapproximated with a running 2-0 vicryl suture.  The skin was reapproximated with a running 4-0 stratafix subcuticular suture.  A sterile dressing was placed. Patient was extubated and sent to PACU in stable condition.  All counts were correct at the conclusion of the case.         Armando Harmon MD  General and Vascular Surgery  [facility]  [phone]

## 2022-05-04 NOTE — CARE PLAN
The patient is Stable - Low risk of patient condition declining or worsening    Shift Goals  Clinical Goals: Pain control    Progress made toward(s) clinical / shift goals:  pain controled.  Problem: Pain - Standard  Goal: Alleviation of pain or a reduction in pain to the patient’s comfort goal  Outcome: Progressing       Patient is not progressing towards the following goals:

## 2022-05-04 NOTE — DISCHARGE PLANNING
Anticipated Discharge Disposition: TBD    Action: Chart review     Barriers to Discharge: None anticipated at this time    Plan: Continue to follow for emerging discharge needs

## 2022-05-04 NOTE — H&P
-------------------------------------------------    Vascular Surgery H&P  -------------------------------------------------------------------------------------------------  Date: 5/4/2022    Surgeon: Armando Harmon M.D. - [facility]  -------------------------------------------------------------------------------------------------    HPI:  This is a 66 y.o. male who is presenting today for elective surgery.  No specific complaints at this time. Questions addressed.      Past Medical History:   Diagnosis Date   • Cataract     bilateral IOL   • COVID-19 07/2021   • Diabetes     type II   • Diabetes mellitus type II 7/30/2009    insulin dependent   • High cholesterol    • HTN (hypertension)    • Hyperlipemia 7/30/2009   • Hypertension 7/30/2009       Past Surgical History:   Procedure Laterality Date   • HERNIA REPAIR     • OTHER Bilateral     cataract extraction with IOL   • OTHER ABDOMINAL SURGERY      cholecystectomy   • TONSILLECTOMY         Current Facility-Administered Medications   Medication Dose Route Frequency Provider Last Rate Last Admin   • lidocaine (XYLOCAINE) 1 % injection 0.5 mL  0.5 mL Intradermal Once PRN Armando Harmon M.D.   0.5 mL at 05/04/22 0728   • lactated ringers infusion   Intravenous Continuous Armando Harmon M.D. 10 mL/hr at 05/04/22 0748 New Bag at 05/04/22 0748       Social History     Socioeconomic History   • Marital status: Single     Spouse name: Not on file   • Number of children: Not on file   • Years of education: Not on file   • Highest education level: Not on file   Occupational History   • Not on file   Tobacco Use   • Smoking status: Never Smoker   • Smokeless tobacco: Never Used   Vaping Use   • Vaping Use: Never used   Substance and Sexual Activity   • Alcohol use: Yes     Alcohol/week: 2.0 oz     Types: 4 Cans of beer per week     Comment: couple of beers on the weekend   • Drug use: Yes     Types: Inhaled     Comment: Marijuana 4/2/22   •  "Sexual activity: Yes     Partners: Female   Other Topics Concern   • Not on file   Social History Narrative   • Not on file     Social Determinants of Health     Financial Resource Strain: Not on file   Food Insecurity: Not on file   Transportation Needs: Not on file   Physical Activity: Not on file   Stress: Not on file   Social Connections: Not on file   Intimate Partner Violence: Not on file   Housing Stability: Not on file       Family History   Problem Relation Age of Onset   • Heart Disease Father         cabg [id number]   • Hypertension Father    • Diabetes Paternal Aunt        Allergies:  Amlodipine, Lisinopril, and Other environmental    Review of Systems:  Noncontributory except as per HPI    Physical Exam:  BP (!) 168/84   Pulse 77   Temp 36.2 °C (97.2 °F) (Temporal)   Resp 18   Ht 1.778 m (5' 10\")   Wt 92.7 kg (204 lb 5.9 oz)   SpO2 95%     Constitutional: Alert, oriented, no acute distress  HEENT:  Normocephalic and atraumatic, EOMI  Neck:   Supple, no JVD,   Cardiovascular: Regular rate and rhythm,   Pulmonary:  Good air entry bilaterally,    Abdominal:  Soft, non-tender, non-distended     Aortic impulse not widened  Musculoskeletal: No edema, no tenderness  Neurological:  CN II-XII grossly intact, no focal deficits  Skin:   Skin is warm and dry. No rash noted.  Psychiatric:  Normal mood and affect.    Labs:                    Assessment/Plan:  This is a 66 y.o. male here today for elective surgery: left carotid endarterectomy with neuromonitoring    I explained the details of the operation, alternatives, and potential risks, including but not limited to bleeding, infection, and risks of anesthesia.  All questions were answered. Patient understands and agrees to proceed.      Armando Harmon MD  [facility] (General and Vascular Surgery)  Cell: [phone] (text or call is fine, if you don't reach me please try my office)  Office: [phone]    5/4/2022    7:57 " AM  ___________________________________________________________________  Patient:Dixon Chung   MRN:[id number]   CSN:[id number]

## 2022-05-04 NOTE — ANESTHESIA PROCEDURE NOTES
Airway    Date/Time: 5/4/2022 8:34 AM  Performed by: Greg Allison M.D.  Authorized by: Greg Allison M.D.     Location:  OR  Urgency:  Elective  Indications for Airway Management:  Anesthesia      Spontaneous Ventilation: absent    Sedation Level:  Deep  Preoxygenated: Yes    Patient Position:  Sniffing  Final Airway Type:  Endotracheal airway  Final Endotracheal Airway:  ETT  Cuffed: Yes    Technique Used for Successful ETT Placement:  Direct laryngoscopy    Insertion Site:  Oral  Blade Type:  Newby  Laryngoscope Blade/Videolaryngoscope Blade Size:  3  ETT Size (mm):  8.0  Measured from:  Teeth  ETT to Teeth (cm):  24  Placement Verified by: auscultation and capnometry    Cormack-Lehane Classification:  Grade I - full view of glottis  Number of Attempts at Approach:  1

## 2022-05-04 NOTE — PROGRESS NOTES
4 Eyes Skin Assessment Completed by Reshma, RN and Shawn, RN.    Head WDL  Ears WDL  Nose WDL  Mouth WDL  Neck Incision dressing to left neck, CDI, LYNN drain with serosang output, DIP, CDI.  Breast/Chest WDL  Shoulder Blades WDL  Spine Redness and Blanching dried scattered scabs  (R) Arm/Elbow/Hand WDL  (L) Arm/Elbow/Hand WDL  Abdomen Scab scattered scabs  Groin WDL  Scrotum/Coccyx/Buttocks WDL  (R) Leg WDL  (L) Leg WDL  (R) Heel/Foot/Toe WDL  (L) Heel/Foot/Toe WDL          Devices In Places Blood Pressure Cuff, Pulse Ox, SCD's and Nasal Cannula      Interventions In Place N/A    Possible Skin Injury No    Pictures Uploaded Into Epic N/A  Wound Consult Placed N/A  RN Wound Prevention Protocol Ordered No

## 2022-05-04 NOTE — ANESTHESIA POSTPROCEDURE EVALUATION
Patient: Dixon Chung    Procedure Summary     Date: 05/04/22 Room / Location: [facility] 03 / SURGERY [facility]    Anesthesia Start: 0828 Anesthesia Stop: 1033    Procedures:       ENDARTERECTOMY, CAROTID (Left Neck)      EEG (ELECTROENCEPHALOGRAM) (N/A Head) Diagnosis: (CAROTID ARTERY STENOSIS)    Surgeons: Armando Harmon M.D. Responsible Provider: Greg Allison M.D.    Anesthesia Type: general ASA Status: 3          Final Anesthesia Type: general  Last vitals  BP   Blood Pressure : 160/82, Arterial BP: (!) 172/60    Temp   36.2 °C (97.1 °F)    Pulse   87   Resp   18    SpO2   97 %      Anesthesia Post Evaluation    Patient location during evaluation: PACU  Patient participation: complete - patient participated  Level of consciousness: awake and alert    Airway patency: patent  Anesthetic complications: no  Cardiovascular status: hemodynamically stable  Respiratory status: acceptable  Hydration status: euvolemic    PONV: none          There were no known complications for this encounter.     Nurse Pain Score: 5 (NPRS)

## 2022-05-05 VITALS
RESPIRATION RATE: 20 BRPM | DIASTOLIC BLOOD PRESSURE: 103 MMHG | TEMPERATURE: 97.2 F | BODY MASS INDEX: 29.26 KG/M2 | HEART RATE: 95 BPM | OXYGEN SATURATION: 95 % | WEIGHT: 204.37 LBS | HEIGHT: 70 IN | SYSTOLIC BLOOD PRESSURE: 155 MMHG

## 2022-05-05 LAB — GLUCOSE BLD STRIP.AUTO-MCNC: 180 MG/DL (ref 65–99)

## 2022-05-05 PROCEDURE — 700102 HCHG RX REV CODE 250 W/ 637 OVERRIDE(OP): Performed by: NURSE PRACTITIONER

## 2022-05-05 PROCEDURE — 700102 HCHG RX REV CODE 250 W/ 637 OVERRIDE(OP): Performed by: SURGERY

## 2022-05-05 PROCEDURE — 82962 GLUCOSE BLOOD TEST: CPT

## 2022-05-05 PROCEDURE — A9270 NON-COVERED ITEM OR SERVICE: HCPCS | Performed by: NURSE PRACTITIONER

## 2022-05-05 PROCEDURE — A9270 NON-COVERED ITEM OR SERVICE: HCPCS | Performed by: SURGERY

## 2022-05-05 RX ORDER — DOCUSATE SODIUM 100 MG/1
100 CAPSULE, LIQUID FILLED ORAL 2 TIMES DAILY
Qty: 15 CAPSULE | Refills: 3 | Status: SHIPPED | OUTPATIENT
Start: 2022-05-05 | End: 2022-10-17

## 2022-05-05 RX ADMIN — CLONIDINE HYDROCHLORIDE 0.1 MG: 0.1 TABLET ORAL at 08:48

## 2022-05-05 RX ADMIN — ASPIRIN 81 MG 81 MG: 81 TABLET ORAL at 06:17

## 2022-05-05 RX ADMIN — ROSUVASTATIN CALCIUM 5 MG: 5 TABLET, FILM COATED ORAL at 06:17

## 2022-05-05 RX ADMIN — LOSARTAN POTASSIUM 100 MG: 50 TABLET, FILM COATED ORAL at 06:17

## 2022-05-05 RX ADMIN — GABAPENTIN 100 MG: 100 CAPSULE ORAL at 08:49

## 2022-05-05 RX ADMIN — INSULIN HUMAN 1 UNITS: 100 INJECTION, SOLUTION PARENTERAL at 08:53

## 2022-05-05 RX ADMIN — HYDROCODONE BITARTRATE AND ACETAMINOPHEN 1 TABLET: 5; 325 TABLET ORAL at 02:50

## 2022-05-05 ASSESSMENT — PAIN DESCRIPTION - PAIN TYPE
TYPE: SURGICAL PAIN

## 2022-05-05 NOTE — CARE PLAN
Problem: Pain - Standard  Goal: Alleviation of pain or a reduction in pain to the patient’s comfort goal  Outcome: Progressing     Problem: Knowledge Deficit - Standard  Goal: Patient and family/care givers will demonstrate understanding of plan of care, disease process/condition, diagnostic tests and medications  Outcome: Progressing     The patient is Stable - Low risk of patient condition declining or worsening    Shift Goals  Clinical Goals: pain control, rest  Patient Goals: rest, pain control  Family Goals: na    Progress made toward(s) clinical / shift goals: Patient's pain controled by prn pain medication. Patient states goals to rest for the night. Call light in reach, patient calls appropriately, bed in locked and lowest position.     Patient is not progressing towards the following goals:

## 2022-05-05 NOTE — DISCHARGE INSTRUCTIONS
Discharge Instructions  Procedure: carotid endarterectomy    1. ACTIVITIES: No strenuous activities or lifting greater than 20 pounds for 2 weeks after surgery.    2. DRIVING: You may drive whenever you are off pain medications and are able to perform the activities needed to drive, i.e. turning, bending, twisting, etc.     3. WOUND: It is not unusual for patients to experience swelling and even bruising, as well as a small amount of drainage from the incision. This is normal and will resolve over the next few days.     4. ICE: you may place ice on the wound to decrease the swelling for the first 24 hours and then discontinue. Apply ice for 20 minutes and then remove for 20 minutes, alternating while awake.    5. BATHING: Remove your white bandage 1-2 days after discharge, no new bandage is required. OK to shower with the bandage on and after the bandage is removed the incision can get wet directly.    6. PAIN MEDICATION: You will be given a prescription for pain medication at discharge. Please take these as directed. It is important to remember not to take medications on an empty stomach as this may cause nausea.     7. BOWEL FUNCTION: After surgery, it is not uncommon for patients to experience constipation. This is due to decreasing activity levels as well as pain medications. You may wish to use a stool softener beginning immediately after surgery, and you may or may not need to use a laxative (Milk of Magnesia, Ex-lax; Senokot, etc.) as well.     8 .CALL IF YOU HAVE: (1) Fevers to more than 101.0 F, (2) Unusual or excessive pain, (3) Drainage or fluid from incision that may be foul smelling, increased tenderness or soreness at the wound or the wound edges are no longer together, redness or swelling at the incision site. Please do not hesitate to call with any other questions.     9. APPOINTMENT: Contact our office at [phone] for a follow-up appointment 2 weeks following your procedure.     If you have any  additional questions, please do not hesitate to call the office and speak to either myself or the physician on call.     Office address:   Armando Harmon MD, [facility]  [address] [place] [address] [address], [place] [address]  [phone]      Discharge Instructions    Discharged to home by car with relative. Discharged via wheelchair, hospital escort: Yes.  Special equipment needed: Not Applicable    Be sure to schedule a follow-up appointment with your primary care doctor or any specialists as instructed.     Discharge Plan:   Diet Plan: Discussed  Activity Level: Discussed  Confirmed Follow up Appointment: Patient to Call and Schedule Appointment  Confirmed Symptoms Management: Discussed  Medication Reconciliation Updated: Yes    I understand that a diet low in cholesterol, fat, and sodium is recommended for good health. Unless I have been given specific instructions below for another diet, I accept this instruction as my diet prescription.   Other diet: Heart healthy    Special Instructions: None    · Is patient discharged on Warfarin / Coumadin?   No     Depression / Suicide Risk    As you are discharged from this [facility] facility, it is important to learn how to keep safe from harming yourself.    Recognize the warning signs:  · Abrupt changes in personality, positive or negative- including increase in energy   · Giving away possessions  · Change in eating patterns- significant weight changes-  positive or negative  · Change in sleeping patterns- unable to sleep or sleeping all the time   · Unwillingness or inability to communicate  · Depression  · Unusual sadness, discouragement and loneliness  · Talk of wanting to die  · Neglect of personal appearance   · Rebelliousness- reckless behavior  · Withdrawal from people/activities they love  · Confusion- inability to concentrate     If you or a loved one observes any of these behaviors or has concerns about self-harm, here's what you can do:  · Talk about  it- your feelings and reasons for harming yourself  · Remove any means that you might use to hurt yourself (examples: pills, rope, extension cords, firearm)  · Get professional help from the community (Mental Health, Substance Abuse, psychological counseling)  · Do not be alone:Call your Safe Contact- someone whom you trust who will be there for you.  · Call your local CRISIS HOTLINE 191-2248 or 998-231-4805  · Call your local Children's Mobile Crisis Response Team [place] (924) 119-2743 or www.[employer]  · Call the toll free National Suicide Prevention Hotlines   · National Suicide Prevention Lifeline 070-383-QMME (3120)  · National Hope Line Network 800-SUICIDE (855-7885)

## 2022-05-05 NOTE — PROGRESS NOTES
-------------------------------------------------    Doing well  Mild-moderate left neck pain but no hematoma, no bleeding, no signficant drainage  No neuro deficits    Home today  Rx for norco and colace sent to pharmacy    Armando Harmon MD  [facility]  Voalte preferred. Otherwise text to cell [phone] or call my office [phone]  __________________________________________________________________  Patient:Dixon Chung   MRN:[id number]   CSN:[id number]

## 2022-05-05 NOTE — PROGRESS NOTES
Bedside report received.  Assessment complete.  A&O x 4. Patient calls appropriately.  Patient up with no assist.   Patient has 5/10 pain.  Neuro intact. Baseline numbness and tinging to BLE.   Denies N&V.  Surgical site to neck CDI.  + void, + flatus, + BM.  Patient denies SOB.  Review plan with of care with patient. Call light and personal belongings with in reach. Hourly rounding in place. All needs met at this time.

## 2022-06-07 ENCOUNTER — DOCUMENTATION (OUTPATIENT)
Dept: VASCULAR LAB | Facility: MEDICAL CENTER | Age: 67
End: 2022-06-07
Payer: MEDICAID

## 2022-06-07 NOTE — PROGRESS NOTES
Referral from [facility]. See Media    Called and left VM for pt to call back to get scheduled with Dr. Bloch. Next available, okay for wait list.

## 2022-06-13 ENCOUNTER — DOCUMENTATION (OUTPATIENT)
Dept: VASCULAR LAB | Facility: MEDICAL CENTER | Age: 67
End: 2022-06-13
Payer: MEDICAID

## 2022-06-13 NOTE — PROGRESS NOTES
Referral from [facility]. See Media     Called and left second VM for pt to call back to get scheduled with Dr. Bloch. Next available, okay for wait list.

## 2022-06-20 ENCOUNTER — DOCUMENTATION (OUTPATIENT)
Dept: VASCULAR LAB | Facility: MEDICAL CENTER | Age: 67
End: 2022-06-20
Payer: MEDICAID

## 2022-06-20 NOTE — PROGRESS NOTES
Referral from [facility]. See Media     Called and left third VM for pt to call back to get scheduled with Dr. Bloch. Next available, okay for wait list.     Pt sent non-compliance letter.

## 2022-06-21 NOTE — PROGRESS NOTES
Patient referred to vascular care  Unfortunately our schedulers have been unable to reach patient despite multiple attempts  Unless patient establishes with a face-to-face visit in our office will be unable to take part in care  Await further patient contact.  Pending further contact, we will defer all further management of vascular disease and its risk factors to PCP and/or referring MD.    Michael J. Bloch, MD  Vascular Care    cc:  ROSETTE bird

## 2022-10-17 ENCOUNTER — OFFICE VISIT (OUTPATIENT)
Dept: VASCULAR LAB | Facility: MEDICAL CENTER | Age: 67
End: 2022-10-17
Attending: INTERNAL MEDICINE
Payer: MEDICAID

## 2022-10-17 VITALS
HEIGHT: 70 IN | BODY MASS INDEX: 29.2 KG/M2 | DIASTOLIC BLOOD PRESSURE: 71 MMHG | SYSTOLIC BLOOD PRESSURE: 138 MMHG | WEIGHT: 204 LBS | HEART RATE: 86 BPM

## 2022-10-17 DIAGNOSIS — I10 HYPERTENSION, UNSPECIFIED TYPE: ICD-10-CM

## 2022-10-17 DIAGNOSIS — Z79.4 TYPE 2 DIABETES MELLITUS WITHOUT COMPLICATION, WITH LONG-TERM CURRENT USE OF INSULIN (HCC): ICD-10-CM

## 2022-10-17 DIAGNOSIS — E11.9 TYPE 2 DIABETES MELLITUS WITHOUT COMPLICATION, WITH LONG-TERM CURRENT USE OF INSULIN (HCC): ICD-10-CM

## 2022-10-17 DIAGNOSIS — I65.22 STENOSIS OF LEFT CAROTID ARTERY: ICD-10-CM

## 2022-10-17 DIAGNOSIS — E78.5 HYPERLIPIDEMIA, UNSPECIFIED HYPERLIPIDEMIA TYPE: ICD-10-CM

## 2022-10-17 PROBLEM — I65.29 CAROTID STENOSIS: Status: ACTIVE | Noted: 2022-10-17

## 2022-10-17 PROCEDURE — 99212 OFFICE O/P EST SF 10 MIN: CPT

## 2022-10-17 PROCEDURE — 99214 OFFICE O/P EST MOD 30 MIN: CPT | Performed by: INTERNAL MEDICINE

## 2022-10-17 RX ORDER — GABAPENTIN 100 MG/1
100 CAPSULE ORAL
COMMUNITY
End: 2022-10-17

## 2022-10-17 RX ORDER — CHLORTHALIDONE 25 MG/1
TABLET ORAL
COMMUNITY
End: 2022-10-17

## 2022-10-17 RX ORDER — PEN NEEDLE, DIABETIC 32GX 5/32"
NEEDLE, DISPOSABLE MISCELLANEOUS
COMMUNITY
Start: 2022-07-26

## 2022-10-17 ASSESSMENT — FIBROSIS 4 INDEX: FIB4 SCORE: 1.37

## 2022-10-17 NOTE — PROGRESS NOTES
VASCULAR MEDICINE CLINIC - INITIAL VISIT  10/17/22     CC:  Dixon Chung is a 66 y.o.  male who presents today  for initial vascular evaluation    Subjective      HPI:  Patient referred for evaluation and management of carotid artery disease in the setting of dyslipidemia, hypertension, diabetes  Patient found to have high-grade left carotid stenosis as incidental finding on imaging study  Had left carotid endarterectomy March 2022  No TIA or stroke symptoms before since  No other personal history of ASCVD  Most recent a1c 7.1  FS usually 100-130 in am fasting  Denies hypoglycemia  Stable insulin dose and other medications  On losartan for BP as monotherapy  Had leg swelling on amlodipine  BPs at home usually 120-140/70-80  No myalgias on low dose rosuvastatin  Takes aspirin daily  No smoking    Past Medical History:   Diagnosis Date    Cataract     bilateral IOL    COVID-19 07/2021    Diabetes     type II    Diabetes mellitus type II 7/30/2009    insulin dependent    High cholesterol     HTN (hypertension)     Hyperlipemia 7/30/2009    Hypertension 7/30/2009        Past Surgical History:   Procedure Laterality Date    OR THROMBOENDARTECTMY NECK,NECK INCIS Left 5/4/2022    Procedure: ENDARTERECTOMY, CAROTID;  Surgeon: Armando Harmon M.D.;  Location: SURGERY [facility];  Service: Vascular    EEG N/A 5/4/2022    Procedure: EEG (ELECTROENCEPHALOGRAM);  Surgeon: Armando Harmon M.D.;  Location: SURGERY [facility];  Service: Vascular    HERNIA REPAIR      OTHER Bilateral     cataract extraction with IOL    OTHER ABDOMINAL SURGERY      cholecystectomy    TONSILLECTOMY          Family History   Problem Relation Age of Onset    Heart Disease Father         cabg 44    Hypertension Father     Diabetes Paternal Aunt         Social History     Tobacco Use    Smoking status: Never    Smokeless tobacco: Never   Vaping Use    Vaping Use: Never used   Substance Use Topics    Alcohol use: Yes     Alcohol/week: 2.0  "oz     Types: 4 Cans of beer per week     Comment: couple of beers on the weekend    Drug use: Yes     Types: Inhaled     Comment: Marijuana 4/2/22        Current Outpatient Medications on File Prior to Visit   Medication Sig Dispense Refill    BD PEN NEEDLE LOS U/F USE TO INJECT INSULIN AS DIRECTED      aspirin (ASA) 81 MG Chew Tab chewable tablet Chew 81 mg every day.      Dulaglutide (TRULICITY SC) Inject 1 Dose under the skin every Monday.      Empagliflozin-metFORMIN HCl (SYNJARDY) 12.5-1000 MG Tab Take 1 tablet by mouth 2 times a day.      rosuvastatin (CRESTOR) 5 MG Tab Take 5 mg by mouth every day.      losartan (COZAAR) 100 MG Tab Take 100 mg by mouth every day.      gabapentin (NEURONTIN) 100 MG Cap Take 100 mg by mouth 4 times a day. Pt takes @@ 2300, 0100, 0300, and 0500      insulin glargine (LANTUS) 100 UNIT/ML Solution Pen-injector injection Inject 20 Units under the skin every evening.       No current facility-administered medications on file prior to visit.        ALLERGIES  Amlodipine, Lisinopril, and Other environmental     DIET AND EXERCISE:  Weight Change:  up about 2 pounds  Diet: decent diabeteic diet  Exercise: sporadic irregular exercise             Objective     Objective:     Vitals:    10/17/22 1440 10/17/22 1443   BP: (!) 146/77 138/71   BP Location: Left arm Left arm   Patient Position: Sitting Sitting   BP Cuff Size: Adult Adult   Pulse: 89 86   Weight: 92.5 kg (204 lb)    Height: 1.778 m (5' 10\")         Physical Exam  Vitals reviewed.   Constitutional:       General: He is not in acute distress.     Appearance: He is not diaphoretic.   HENT:      Head: Normocephalic and atraumatic.   Eyes:      General: No scleral icterus.     Conjunctiva/sclera: Conjunctivae normal.   Neck:      Vascular: No carotid bruit.   Cardiovascular:      Rate and Rhythm: Normal rate and regular rhythm.      Heart sounds: Normal heart sounds. No murmur heard.  Pulmonary:      Effort: Pulmonary effort is " normal. No respiratory distress.      Breath sounds: Normal breath sounds. No wheezing or rales.   Musculoskeletal:      Right lower leg: No edema.      Left lower leg: No edema.   Skin:     Coloration: Skin is not pale.   Neurological:      General: No focal deficit present.      Mental Status: He is alert and oriented to person, place, and time.      Cranial Nerves: No cranial nerve deficit.      Coordination: Coordination normal.      Gait: Gait is intact. Gait normal.   Psychiatric:         Mood and Affect: Mood and affect normal.         Behavior: Behavior normal.        DATA REVIEW    No results found for: CHOLSTRLTOT, LDL, HDL, TRIGLYCERIDE    Lab Results   Component Value Date/Time    SODIUM 142 04/25/2022 09:01 AM    POTASSIUM 4.6 04/25/2022 09:01 AM    CHLORIDE 102 04/25/2022 09:01 AM    CO2 23 04/25/2022 09:01 AM    GLUCOSE 190 (H) 04/25/2022 09:01 AM    BUN 15 04/25/2022 09:01 AM    CREATININE 0.90 04/25/2022 09:01 AM     Lab Results   Component Value Date/Time    ALKPHOSPHAT 69 11/20/2021 01:40 PM    ASTSGOT 20 11/20/2021 01:40 PM    ALTSGPT 19 11/20/2021 01:40 PM    TBILIRUBIN 0.3 11/20/2021 01:40 PM       Lab Results   Component Value Date/Time    HBA1C 7.3 (H) 04/25/2022 09:01 AM       No results found for: MICROALBCALC, MALBCRT, MALBEXCR, [id number], MICROALBUR, MICRALB, UMICROALBUM, MICROALBTIM        Medical Decision Making:  Today's Assessment / Status / Plan:     1. Type 2 diabetes mellitus without complication, with long-term current use of insulin (HCC)  HEMOGLOBIN A1C    MICROALBUMIN CREAT RATIO URINE      2. Hyperlipidemia, unspecified hyperlipidemia type  Lipid Profile    Comp Metabolic Panel    TSH      3. Hypertension, unspecified type  Comp Metabolic Panel    MICROALBUMIN CREAT RATIO URINE      4. Stenosis of left carotid artery  US-CAROTID DOPPLER BILAT           Patient Type: Secondary Prevention    Etiology of Established CVD if Present:     Carotid stenosis, asymptomatic, status post  left CEA March 2022 -doing well postoperatively.  Recheck carotid ultrasound.  Otherwise medical management as per below    Lipid Management: Qualifies for Statin Therapy Based on 2018 ACC/AHA Guidelines: yes  Calculated 10-Year Risk of ASCVD: N/A  Currently on Statin: Yes  Goal LDL less than 70 non-HDL less than 100  Currently on moderate intensity statin  Plan:  -Recheck fasting lipid panel lipoprotein a  -Continue rosuvastatin 5 mg a day pending result  -Intensify therapy if above goal    Blood Pressure Management:  Acc/aha (2017) Blood Pressure Goal <130/80  Appears above goal both in the office and at home  Leg swelling on amlodipine  Did not tolerate chlorthalidone for unclear reasons in the past  Patient would like to try intensify lifestyle modification before increasing meds  Plan:  -Continue losartan 100 mg daily  -Intensify lifestyle modification as per below  -Recheck GFR, electrolytes, urine for albumin  -Continue home blood pressure monitoring  -Intensify therapy if above goal -likely reasonable to add low-dose hydrochlorothiazide    Glycemic Status: Diabetic  Goal A1c less than 7.5%  Avoidance of hypoglycemia paramount  Control seems reasonable on most recent blood work and fingersticks  Plan:  -Continue current dose of Lantus   -Continue Synjardy   -Continue Trulicity   -Recheck A1c and urine for albumin with next blood work  -Call if any hypoglycemia-  -Continue fingersticks    Anti-Platelet/Anti-Coagulant Tx: yes  Continue indefinite low-dose aspirin    Smoking: continue complete avoidance of all tobacco products    Physical Activity: More regular walking recommended    Weight Management and Nutrition: Continue good diabetic diet    Instructed to follow-up with PCP for remainder of adult medical needs: yes  We will partner with other providers in the management of established vascular disease and cardiometabolic risk factors.    Studies to Be Obtained: Carotid duplex   Labs to Be Obtained: as above  prior to next visit    Follow up in: 2 months    Michael J Bloch, M.D.     Cc: KP Harmon

## 2022-11-08 ENCOUNTER — PATIENT MESSAGE (OUTPATIENT)
Dept: HEALTH INFORMATION MANAGEMENT | Facility: OTHER | Age: 67
End: 2022-11-08

## 2022-12-08 ENCOUNTER — APPOINTMENT (OUTPATIENT)
Dept: RADIOLOGY | Facility: MEDICAL CENTER | Age: 67
End: 2022-12-08
Attending: INTERNAL MEDICINE
Payer: MEDICAID

## 2022-12-19 ENCOUNTER — DOCUMENTATION (OUTPATIENT)
Dept: VASCULAR LAB | Facility: MEDICAL CENTER | Age: 67
End: 2022-12-19
Payer: MEDICAID

## 2022-12-19 ENCOUNTER — HOSPITAL ENCOUNTER (OUTPATIENT)
Dept: RADIOLOGY | Facility: MEDICAL CENTER | Age: 67
End: 2022-12-19
Attending: INTERNAL MEDICINE
Payer: MEDICAID

## 2022-12-19 DIAGNOSIS — I65.22 STENOSIS OF LEFT CAROTID ARTERY: ICD-10-CM

## 2022-12-19 PROCEDURE — 93880 EXTRACRANIAL BILAT STUDY: CPT

## 2022-12-20 NOTE — PROGRESS NOTES
Carotid duplex without signficiant stenosis.   Will recheck in one year.     Michael Bloch, MD  Vascular Care

## 2022-12-21 ENCOUNTER — OFFICE VISIT (OUTPATIENT)
Dept: VASCULAR LAB | Facility: MEDICAL CENTER | Age: 67
End: 2022-12-21
Attending: INTERNAL MEDICINE
Payer: MEDICAID

## 2022-12-21 VITALS
DIASTOLIC BLOOD PRESSURE: 100 MMHG | HEIGHT: 70 IN | WEIGHT: 199.5 LBS | SYSTOLIC BLOOD PRESSURE: 207 MMHG | HEART RATE: 99 BPM | BODY MASS INDEX: 28.56 KG/M2

## 2022-12-21 DIAGNOSIS — E11.9 TYPE 2 DIABETES MELLITUS WITHOUT COMPLICATION, WITH LONG-TERM CURRENT USE OF INSULIN (HCC): ICD-10-CM

## 2022-12-21 DIAGNOSIS — Z79.4 TYPE 2 DIABETES MELLITUS WITHOUT COMPLICATION, WITH LONG-TERM CURRENT USE OF INSULIN (HCC): ICD-10-CM

## 2022-12-21 DIAGNOSIS — I10 HYPERTENSION, UNSPECIFIED TYPE: ICD-10-CM

## 2022-12-21 DIAGNOSIS — E78.5 HYPERLIPIDEMIA, UNSPECIFIED HYPERLIPIDEMIA TYPE: ICD-10-CM

## 2022-12-21 DIAGNOSIS — I65.22 STENOSIS OF LEFT CAROTID ARTERY: ICD-10-CM

## 2022-12-21 PROCEDURE — 99214 OFFICE O/P EST MOD 30 MIN: CPT

## 2022-12-21 PROCEDURE — 99212 OFFICE O/P EST SF 10 MIN: CPT

## 2022-12-21 RX ORDER — ASPIRIN 81 MG/1
81 TABLET, CHEWABLE ORAL DAILY
Qty: 100 TABLET | Refills: 3 | Status: SHIPPED | OUTPATIENT
Start: 2022-12-21 | End: 2023-11-16

## 2022-12-21 RX ORDER — HYDROCHLOROTHIAZIDE 25 MG/1
25 TABLET ORAL DAILY
Qty: 90 TABLET | Refills: 3 | Status: SHIPPED | OUTPATIENT
Start: 2022-12-21 | End: 2023-05-03

## 2022-12-21 ASSESSMENT — FIBROSIS 4 INDEX: FIB4 SCORE: 1.4

## 2022-12-21 NOTE — PROGRESS NOTES
VASCULAR MEDICINE CLINIC - Follow up VISIT  12/21/22     CC:  Dixon Chung is a 67 y.o.  male who presents today  for follow up vascular evaluation    Subjective      HPI:  Patient referred for evaluation and management of carotid artery disease in the setting of dyslipidemia, hypertension, diabetes and found to have high-grade left carotid stenosis as incidental finding on imaging study had left carotid endarterectomy March 2022    Had echo done earlier this month  No TIA or stroke symptoms   FS usually 100-180 in am fasting  Denies hypoglycemia  Stable insulin dose and other medications, sees RONAK for DM meds, has appt in January   On losartan for BP as monotherapy, tolerating  BPs at home usually 120-140/70-80s but rare readings  On rosuvastatin, no myalgias   Takes aspirin daily, but has been unable to find in stores recently so hasn't been taking it  Recently had the flu last month, is now starting to feel better  Has lost some weight, watching diet, limiting sugars  Decreased activity and exercise due to cold weather  Did not get labs drawn     Past Medical History:   Diagnosis Date    Cataract     bilateral IOL    COVID-19 07/2021    Diabetes     type II    Diabetes mellitus type II 7/30/2009    insulin dependent    High cholesterol     HTN (hypertension)     Hyperlipemia 7/30/2009    Hypertension 7/30/2009        Past Surgical History:   Procedure Laterality Date    AL THROMBOENDARTECTMY NECK,NECK INCIS Left 5/4/2022    Procedure: ENDARTERECTOMY, CAROTID;  Surgeon: Armando Harmon M.D.;  Location: SURGERY [facility];  Service: Vascular    EEG N/A 5/4/2022    Procedure: EEG (ELECTROENCEPHALOGRAM);  Surgeon: Armando Harmon M.D.;  Location: SURGERY [facility];  Service: Vascular    HERNIA REPAIR      OTHER Bilateral     cataract extraction with IOL    OTHER ABDOMINAL SURGERY      cholecystectomy    TONSILLECTOMY          Family History   Problem Relation Age of Onset    Heart Disease Father          cabg 44    Hypertension Father     Diabetes Paternal Aunt         Social History     Tobacco Use    Smoking status: Never    Smokeless tobacco: Never   Vaping Use    Vaping Use: Never used   Substance Use Topics    Alcohol use: Yes     Alcohol/week: 2.0 oz     Types: 4 Cans of beer per week     Comment: couple of beers on the weekend    Drug use: Yes     Types: Inhaled     Comment: Marijuana 4/2/22        Current Outpatient Medications on File Prior to Visit   Medication Sig Dispense Refill    BD PEN NEEDLE LOS U/F USE TO INJECT INSULIN AS DIRECTED      aspirin (ASA) 81 MG Chew Tab chewable tablet Chew 81 mg every day.      Dulaglutide (TRULICITY SC) Inject 1 Dose under the skin every Monday.      Empagliflozin-metFORMIN HCl (SYNJARDY) 12.5-1000 MG Tab Take 1 tablet by mouth 2 times a day.      rosuvastatin (CRESTOR) 5 MG Tab Take 5 mg by mouth every day.      losartan (COZAAR) 100 MG Tab Take 100 mg by mouth every day.      gabapentin (NEURONTIN) 100 MG Cap Take 100 mg by mouth 4 times a day. Pt takes @@ 2300, 0100, 0300, and 0500      insulin glargine (LANTUS) 100 UNIT/ML Solution Pen-injector injection Inject 20 Units under the skin every evening.       No current facility-administered medications on file prior to visit.        ALLERGIES  Amlodipine, Lisinopril, and Other environmental     DIET AND EXERCISE:  Weight Change:  up about 2 pounds  Diet: decent diabeteic diet  Exercise: sporadic irregular exercise             Objective     Objective:     There were no vitals filed for this visit.       Physical Exam  Vitals reviewed.   Constitutional:       General: He is not in acute distress.     Appearance: Normal appearance. He is not diaphoretic.   HENT:      Head: Normocephalic and atraumatic.   Eyes:      General: No scleral icterus.     Conjunctiva/sclera: Conjunctivae normal.   Cardiovascular:      Rate and Rhythm: Normal rate and regular rhythm.      Heart sounds: Normal heart sounds. No murmur  heard.  Pulmonary:      Effort: Pulmonary effort is normal. No respiratory distress.      Breath sounds: Normal breath sounds. No wheezing or rales.   Musculoskeletal:      Right lower leg: No edema.      Left lower leg: No edema.   Skin:     General: Skin is warm and dry.      Coloration: Skin is not pale.   Neurological:      General: No focal deficit present.      Mental Status: He is alert and oriented to person, place, and time.      Coordination: Coordination normal.      Gait: Gait is intact. Gait normal.   Psychiatric:         Mood and Affect: Mood and affect normal.         Behavior: Behavior normal.        DATA REVIEW    No results found for: CHOLSTRLTOT, LDL, HDL, TRIGLYCERIDE    Lab Results   Component Value Date/Time    SODIUM 142 04/25/2022 09:01 AM    POTASSIUM 4.6 04/25/2022 09:01 AM    CHLORIDE 102 04/25/2022 09:01 AM    CO2 23 04/25/2022 09:01 AM    GLUCOSE 190 (H) 04/25/2022 09:01 AM    BUN 15 04/25/2022 09:01 AM    CREATININE 0.90 04/25/2022 09:01 AM     Lab Results   Component Value Date/Time    ALKPHOSPHAT 69 11/20/2021 01:40 PM    ASTSGOT 20 11/20/2021 01:40 PM    ALTSGPT 19 11/20/2021 01:40 PM    TBILIRUBIN 0.3 11/20/2021 01:40 PM       Lab Results   Component Value Date/Time    HBA1C 7.3 (H) 04/25/2022 09:01 AM       No results found for: MICROALBCALC, MALBCRT, MALBEXCR, [id number], MICROALBUR, MICRALB, UMICROALBUM, MICROALBTIM          Carotid US 12/2022   FINDINGS   Right-   Heterogeneous plaque of the bifurcation extending into the internal carotid    artery. <50% internal carotid artery stenosis.    Plaque is irregular on the surface and heterogeneous with mixed    echogenicity.    The subclavian artery waveforms are antegrade and normal in character and    velocity.    Left-   Carotid endarterectomy is present and appears patent.    Elevated velocities in the proximal subclavian artery consistent with >50%    stenosis.     Medical Decision Making:  Today's Assessment / Status / Plan:      1. Type 2 diabetes mellitus without complication, with long-term current use of insulin (HCC)        2. Hyperlipidemia, unspecified hyperlipidemia type        3. Hypertension, unspecified type        4. Stenosis of left carotid artery             Patient Type: Secondary Prevention    Etiology of Established CVD if Present:     Carotid stenosis, asymptomatic, status post left CEA March 2022 -doing well postoperatively.  Carotid US done 12/2022 showing no significant stenosis.  Recheck carotid ultrasound 1 year, due 12/2023.  Medical management as per below    Lipid Management: Qualifies for Statin Therapy Based on 2018 ACC/AHA Guidelines: yes  Calculated 10-Year Risk of ASCVD: N/A  Currently on Statin: Yes  Goal LDL less than 70 non-HDL less than 100  Currently on moderate intensity statin  Plan:  -Recheck fasting lipid panel and lipoprotein a - orders reprinted, pt to do prior to next appt  -Continue rosuvastatin 5 mg a day pending result  -Intensify therapy if above goal    Blood Pressure Management:  Acc/aha (2017) Blood Pressure Goal <130/80  Appears above goal both in the office and at home  Leg swelling on amlodipine  Did not tolerate chlorthalidone for unclear reasons in the past  Patient would like to try intensify lifestyle modification before increasing meds  Unclear BPs at home, and office bps very elevated, pt reports they are typically elevated in office.  Denies HA, vision changes, or other CVA/TIA like symptoms.    Plan:  -Continue losartan 100 mg daily  -Intensify lifestyle modification as per below  -Recheck GFR, electrolytes, urine for albumin - orders reprinted  -Continue home blood pressure monitoring - reviewed and given log, pt to bring in to next appt  -start HCTZ 12.5mg daily.  Consider increasing to 25mg daily if BP remain elevated  -order ABPM at next appt to help rule out white coat hypertension.      Glycemic Status: Diabetic  Goal A1c less than 7.5%  Avoidance of hypoglycemia  [facility]  Control seems reasonable on most recent blood work and fingersticks  Has follow up appt with PCP at [facility] in january  Plan:  -Continue current dose of Lantus   -Continue Synjardy   -Continue Trulicity   -Recheck A1c and urine for albumin with next blood work - reprinted  -Call if any hypoglycemia-  -Continue fingersticks    Anti-Platelet/Anti-Coagulant Tx: yes  Continue indefinite low-dose aspirin    Smoking: continue complete avoidance of all tobacco products    Physical Activity: More regular walking recommended    Weight Management and Nutrition: Continue good diabetic diet    Instructed to follow-up with PCP for remainder of adult medical needs: yes  We will partner with other providers in the management of established vascular disease and cardiometabolic risk factors.    Studies to Be Obtained: ABPM order at next appt  Labs to Be Obtained: lipid, cmp, a1c, microalbumin, lp(a), TSH - orders reprinted for pt    Follow up in: 2 months    Shefali MCNAMARA  [facility]      Cc: KP Harmon

## 2023-02-02 LAB — HBA1C MFR BLD: 8.1 % (ref ?–5.8)

## 2023-02-08 ENCOUNTER — OFFICE VISIT (OUTPATIENT)
Dept: VASCULAR LAB | Facility: MEDICAL CENTER | Age: 68
End: 2023-02-08
Attending: NURSE PRACTITIONER
Payer: MEDICAID

## 2023-02-08 VITALS
HEART RATE: 86 BPM | WEIGHT: 202 LBS | HEIGHT: 70 IN | SYSTOLIC BLOOD PRESSURE: 209 MMHG | BODY MASS INDEX: 28.92 KG/M2 | DIASTOLIC BLOOD PRESSURE: 111 MMHG

## 2023-02-08 DIAGNOSIS — Z79.4 TYPE 2 DIABETES MELLITUS WITHOUT COMPLICATION, WITH LONG-TERM CURRENT USE OF INSULIN (HCC): ICD-10-CM

## 2023-02-08 DIAGNOSIS — E11.9 TYPE 2 DIABETES MELLITUS WITHOUT COMPLICATION, WITH LONG-TERM CURRENT USE OF INSULIN (HCC): ICD-10-CM

## 2023-02-08 DIAGNOSIS — I65.22 STENOSIS OF LEFT CAROTID ARTERY: ICD-10-CM

## 2023-02-08 DIAGNOSIS — I10 HYPERTENSION, UNSPECIFIED TYPE: ICD-10-CM

## 2023-02-08 DIAGNOSIS — E78.5 HYPERLIPIDEMIA, UNSPECIFIED HYPERLIPIDEMIA TYPE: ICD-10-CM

## 2023-02-08 PROCEDURE — 99214 OFFICE O/P EST MOD 30 MIN: CPT | Performed by: NURSE PRACTITIONER

## 2023-02-08 PROCEDURE — 99212 OFFICE O/P EST SF 10 MIN: CPT

## 2023-02-08 ASSESSMENT — FIBROSIS 4 INDEX: FIB4 SCORE: 1.4

## 2023-02-08 NOTE — PROGRESS NOTES
VASCULAR MEDICINE CLINIC - Follow up VISIT  02/08/2023    CC:  Dixon Chung is a 67 y.o. male who presents today for follow up vascular evaluation    Subjective      HPI:  Patient referred for evaluation and management of carotid artery disease in the setting of dyslipidemia, hypertension, diabetes and found to have high-grade left carotid stenosis as incidental finding on imaging study had left carotid endarterectomy March 2022    Had echo done earlier this month  No TIA or stroke symptoms   FS usually 100-180 in am fasting  Denies hypoglycemia  On Lantus 20 units nightly  Started HCTZ and has seen good BP reduction at home  Home readings mostly 130/60-90's  Tolerating rosuva  On ASA daily with mild bruising  Sees [facility] for DM management- has appt in March  Not exercising as much d/t cold weather  Admits to diet being higher in sugar and starches  Labs done at [facility]  Never had ABPM, reports white coat HTN    Family History   Problem Relation Age of Onset    Heart Disease Father         cabg 44    Hypertension Father     Diabetes Paternal Aunt       Social History     Tobacco Use    Smoking status: Never    Smokeless tobacco: Never   Vaping Use    Vaping Use: Never used   Substance Use Topics    Alcohol use: Yes     Alcohol/week: 2.0 oz     Types: 4 Cans of beer per week     Comment: couple of beers on the weekend    Drug use: Yes     Types: Inhaled     Comment: Marijuana 4/2/22      Current Outpatient Medications on File Prior to Visit   Medication Sig Dispense Refill    aspirin (ASA) 81 MG Chew Tab chewable tablet Chew 1 Tablet every day. 100 Tablet 3    hydroCHLOROthiazide (HYDRODIURIL) 25 MG Tab Take 1 Tablet by mouth every day. 90 Tablet 3    BD PEN NEEDLE LOS U/F USE TO INJECT INSULIN AS DIRECTED      Dulaglutide (TRULICITY SC) Inject 1 Dose under the skin every Monday.      Empagliflozin-metFORMIN HCl (SYNJARDY) 12.5-1000 MG Tab Take 1 tablet by mouth 2 times a day.      rosuvastatin (CRESTOR) 5 MG Tab  "Take 5 mg by mouth every day.      losartan (COZAAR) 100 MG Tab Take 100 mg by mouth every day.      gabapentin (NEURONTIN) 100 MG Cap Take 100 mg by mouth 4 times a day. Pt takes @@ 2300, 0100, 0300, and 0500      insulin glargine (LANTUS) 100 UNIT/ML Solution Pen-injector injection Inject 20 Units under the skin every evening.       No current facility-administered medications on file prior to visit.      ALLERGIES  Amlodipine, Lisinopril, and Other environmental     DIET AND EXERCISE:  Weight Change:  up about 3 pounds  Diet: decent diabeteic diet  Exercise: sporadic irregular exercise         Objective     Objective:     Vitals:    02/08/23 0908 02/08/23 0911   BP: (!) 213/92 (!) 209/111   BP Location: Left arm Left arm   Patient Position: Sitting Sitting   BP Cuff Size: Adult Adult   Pulse: 86 86   Weight: 91.6 kg (202 lb)    Height: 1.778 m (5' 10\")      Physical Exam  Vitals reviewed.   Constitutional:       General: He is not in acute distress.     Appearance: Normal appearance. He is not diaphoretic.   HENT:      Head: Normocephalic and atraumatic.   Eyes:      General: No scleral icterus.     Conjunctiva/sclera: Conjunctivae normal.   Cardiovascular:      Rate and Rhythm: Normal rate and regular rhythm.      Heart sounds: Normal heart sounds. No murmur heard.  Pulmonary:      Effort: Pulmonary effort is normal. No respiratory distress.      Breath sounds: Normal breath sounds. No wheezing or rales.   Musculoskeletal:      Right lower leg: No edema.      Left lower leg: No edema.   Skin:     General: Skin is warm and dry.      Coloration: Skin is not pale.   Neurological:      General: No focal deficit present.      Mental Status: He is alert and oriented to person, place, and time.      Coordination: Coordination normal.      Gait: Gait is intact. Gait normal.   Psychiatric:         Mood and Affect: Mood and affect normal.         Behavior: Behavior normal.      DATA REVIEW    No results found for: " CHOLSTRLTOT, LDL, HDL, TRIGLYCERIDE    Lab Results   Component Value Date/Time    SODIUM 142 04/25/2022 09:01 AM    POTASSIUM 4.6 04/25/2022 09:01 AM    CHLORIDE 102 04/25/2022 09:01 AM    CO2 23 04/25/2022 09:01 AM    GLUCOSE 190 (H) 04/25/2022 09:01 AM    BUN 15 04/25/2022 09:01 AM    CREATININE 0.90 04/25/2022 09:01 AM     Lab Results   Component Value Date/Time    ALKPHOSPHAT 69 11/20/2021 01:40 PM    ASTSGOT 20 11/20/2021 01:40 PM    ALTSGPT 19 11/20/2021 01:40 PM    TBILIRUBIN 0.3 11/20/2021 01:40 PM       Lab Results   Component Value Date/Time    HBA1C 7.3 (H) 04/25/2022 09:01 AM       No results found for: MICROALBCALC, MALBCRT, MALBEXCR, [id number], MICROALBUR, MICRALB, UMICROALBUM, MICROALBTIM          Carotid US 12/2022   FINDINGS   Right-   Heterogeneous plaque of the bifurcation extending into the internal carotid    artery. <50% internal carotid artery stenosis.    Plaque is irregular on the surface and heterogeneous with mixed    echogenicity.    The subclavian artery waveforms are antegrade and normal in character and    velocity.    Left-   Carotid endarterectomy is present and appears patent.    Elevated velocities in the proximal subclavian artery consistent with >50%    stenosis.     Medical Decision Making:  Today's Assessment / Status / Plan:     1. Type 2 diabetes mellitus without complication, with long-term current use of insulin ([facility])  HEMOGLOBIN A1C      2. Hyperlipidemia, unspecified hyperlipidemia type  Lipid Profile      3. Hypertension, unspecified type  Referral to 24-Hour Blood Pressure Monitoring    Comp Metabolic Panel      4. Stenosis of left carotid artery           Patient Type: Secondary Prevention    Etiology of Established CVD if Present:     Carotid stenosis, asymptomatic, status post left CEA March 2022 -doing well postoperatively.  Carotid US done 12/2022 showing no significant stenosis.  Recheck carotid ultrasound 1 year, due 12/2023.  Medical management as per  below    Lipid Management: Qualifies for Statin Therapy Based on 2018 ACC/AHA Guidelines: yes  Calculated 10-Year Risk of ASCVD: N/A  Currently on Statin: Yes  Goal LDL less than 70 non-HDL less than 100  Currently on moderate intensity statin  Plan:  -Recheck fasting lipid panel and lipoprotein a - orders reprinted, pt to do prior to next appt  -Continue rosuvastatin 5 mg a day   -Intensify therapy if above goal    Blood Pressure Management:  Acc/aha (2017) Blood Pressure Goal <130/80  Appears very elevated in office   Only slightly above goal at home   Leg swelling on amlodipine  Did not tolerate chlorthalidone for unclear reasons in the past  Pt reports they are typically elevated in office.    Denies CP, SOB, HA, vision changes, or other CVA/TIA like symptoms.    Plan:  -Continue losartan 100 mg daily  -Intensify lifestyle modification as per below  -Recheck GFR, electrolytes, urine for albumin - orders reprinted  -Continue home blood pressure monitoring - reviewed and given log, pt to bring in to next appt  -Increase HCTZ 25mg daily   -Obtain ABPM to confirm white coat HTN       Glycemic Status: Diabetic  Goal A1c less than 7.5%  Recent A1C 8.1%  Avoidance of hypoglycemia paramount  Control seems reasonable on most recent blood work and fingersticks  Has follow up appt with PCP at [facility] as planned  Plan:  -Increase dose of Lantus to 19 units- defer further changes to [facility]  -Continue Synjardy   -Continue Trulicity   -Recheck A1c in 3 months   -Call if any hypoglycemia  -Continue fingersticks    Anti-Platelet/Anti-Coagulant Tx: yes  Continue indefinite low-dose aspirin    Smoking: continue complete avoidance of all tobacco products    Physical Activity: More regular walking recommended    Weight Management and Nutrition: Continue good diabetic diet    Instructed to follow-up with PCP for remainder of adult medical needs: yes  We will partner with other providers in the management of established vascular disease and  cardiometabolic risk factors.    Studies to Be Obtained: ABPM order at next appt  Labs to Be Obtained: as ordered above    Follow up in: 3 months for BP review; labs after next appt    Shefali MCNAMARA  [facility]      Cc: KP Harmon

## 2023-03-13 ENCOUNTER — APPOINTMENT (OUTPATIENT)
Dept: VASCULAR LAB | Facility: MEDICAL CENTER | Age: 68
End: 2023-03-13
Attending: NURSE PRACTITIONER
Payer: MEDICAID

## 2023-03-20 ENCOUNTER — NON-PROVIDER VISIT (OUTPATIENT)
Dept: VASCULAR LAB | Facility: MEDICAL CENTER | Age: 68
End: 2023-03-20
Attending: NURSE PRACTITIONER
Payer: MEDICAID

## 2023-03-20 PROCEDURE — 93788 AMBL BP MNTR W/SW A/R: CPT

## 2023-03-20 PROCEDURE — 93786 AMBL BP MNTR W/SW REC ONLY: CPT

## 2023-03-20 NOTE — NON-PROVIDER
ABPM placed at 9:28 AM . Pt to return monitor tomorrow after 9:28 am to ensure a 24 hr reading.  verbal understanding provided.

## 2023-05-03 ENCOUNTER — OFFICE VISIT (OUTPATIENT)
Dept: VASCULAR LAB | Facility: MEDICAL CENTER | Age: 68
End: 2023-05-03
Attending: NURSE PRACTITIONER
Payer: MEDICAID

## 2023-05-03 VITALS
HEIGHT: 70 IN | WEIGHT: 204 LBS | HEART RATE: 93 BPM | SYSTOLIC BLOOD PRESSURE: 173 MMHG | BODY MASS INDEX: 29.2 KG/M2 | DIASTOLIC BLOOD PRESSURE: 73 MMHG

## 2023-05-03 DIAGNOSIS — Z79.4 TYPE 2 DIABETES MELLITUS WITHOUT COMPLICATION, WITH LONG-TERM CURRENT USE OF INSULIN (HCC): ICD-10-CM

## 2023-05-03 DIAGNOSIS — E78.5 HYPERLIPIDEMIA, UNSPECIFIED HYPERLIPIDEMIA TYPE: ICD-10-CM

## 2023-05-03 DIAGNOSIS — I10 HYPERTENSION, UNSPECIFIED TYPE: ICD-10-CM

## 2023-05-03 DIAGNOSIS — E11.9 TYPE 2 DIABETES MELLITUS WITHOUT COMPLICATION, WITH LONG-TERM CURRENT USE OF INSULIN (HCC): ICD-10-CM

## 2023-05-03 DIAGNOSIS — I65.22 STENOSIS OF LEFT CAROTID ARTERY: ICD-10-CM

## 2023-05-03 PROCEDURE — 99214 OFFICE O/P EST MOD 30 MIN: CPT | Performed by: NURSE PRACTITIONER

## 2023-05-03 PROCEDURE — 99212 OFFICE O/P EST SF 10 MIN: CPT

## 2023-05-03 RX ORDER — LOSARTAN POTASSIUM AND HYDROCHLOROTHIAZIDE 25; 100 MG/1; MG/1
1 TABLET ORAL DAILY
Qty: 90 TABLET | Refills: 3 | Status: SHIPPED | OUTPATIENT
Start: 2023-05-03

## 2023-05-03 ASSESSMENT — FIBROSIS 4 INDEX: FIB4 SCORE: 1.4

## 2023-05-03 NOTE — PROGRESS NOTES
VASCULAR MEDICINE CLINIC - Follow up VISIT  05/03/2023    Dixon Chung is a 67 y.o. male who presents today for follow up vascular evaluation    Subjective      HPI:  Patient referred for evaluation and management of carotid artery disease in the setting of dyslipidemia, hypertension, diabetes and found to have high-grade left carotid stenosis as incidental finding on imaging study had left carotid endarterectomy March 2022  Completed ABPM   Denies any CV complaints  RONAK closely managing his DM  No stroke symptoms  Not exercising regularly- usually walks more in summer  Tolerating all his meds well  On ASA with mild bruising    Family History   Problem Relation Age of Onset    Heart Disease Father         cabg 44    Hypertension Father     Diabetes Paternal Aunt       Social History     Tobacco Use    Smoking status: Never    Smokeless tobacco: Never   Vaping Use    Vaping Use: Never used   Substance Use Topics    Alcohol use: Yes     Alcohol/week: 2.0 oz     Types: 4 Cans of beer per week     Comment: couple of beers on the weekend    Drug use: Yes     Types: Inhaled     Comment: Marijuana 4/2/22      Current Outpatient Medications on File Prior to Visit   Medication Sig Dispense Refill    aspirin (ASA) 81 MG Chew Tab chewable tablet Chew 1 Tablet every day. 100 Tablet 3    BD PEN NEEDLE LOS U/F USE TO INJECT INSULIN AS DIRECTED      Dulaglutide (TRULICITY SC) Inject 1 Dose under the skin every Monday.      Empagliflozin-metFORMIN HCl (SYNJARDY) 12.5-1000 MG Tab Take 1 tablet by mouth 2 times a day.      rosuvastatin (CRESTOR) 5 MG Tab Take 5 mg by mouth every day.      gabapentin (NEURONTIN) 100 MG Cap Take 100 mg by mouth 4 times a day. Pt takes @@ 2300, 0100, 0300, and 0500      insulin glargine (LANTUS) 100 UNIT/ML Solution Pen-injector injection Inject 20 Units under the skin every evening.       No current facility-administered medications on file prior to visit.      ALLERGIES  Amlodipine, Lisinopril,  "and Other environmental     DIET AND EXERCISE:  Weight Change:  up about 3 pounds  Diet: decent diabeteic diet  Exercise: sporadic irregular exercise         Objective     Objective:     Vitals:    05/03/23 0941 05/03/23 0944   BP: (!) 172/88 (!) 173/73   BP Location: Left arm Left arm   Patient Position: Sitting Sitting   BP Cuff Size: Adult Adult   Pulse: 93    Weight: 92.5 kg (204 lb)    Height: 1.778 m (5' 10\")      Physical Exam  Vitals reviewed.   Constitutional:       General: He is not in acute distress.     Appearance: Normal appearance. He is not diaphoretic.   HENT:      Head: Normocephalic and atraumatic.   Eyes:      General: No scleral icterus.     Conjunctiva/sclera: Conjunctivae normal.   Cardiovascular:      Rate and Rhythm: Normal rate and regular rhythm.      Heart sounds: Normal heart sounds. No murmur heard.  Pulmonary:      Effort: Pulmonary effort is normal. No respiratory distress.      Breath sounds: Normal breath sounds. No wheezing or rales.   Musculoskeletal:      Right lower leg: No edema.      Left lower leg: No edema.   Skin:     General: Skin is warm and dry.      Coloration: Skin is not pale.   Neurological:      General: No focal deficit present.      Mental Status: He is alert and oriented to person, place, and time.      Coordination: Coordination normal.      Gait: Gait is intact. Gait normal.   Psychiatric:         Mood and Affect: Mood and affect normal.         Behavior: Behavior normal.      DATA REVIEW    No results found for: CHOLSTRLTOT, LDL, HDL, TRIGLYCERIDE    Lab Results   Component Value Date/Time    SODIUM 142 04/25/2022 09:01 AM    POTASSIUM 4.6 04/25/2022 09:01 AM    CHLORIDE 102 04/25/2022 09:01 AM    CO2 23 04/25/2022 09:01 AM    GLUCOSE 190 (H) 04/25/2022 09:01 AM    BUN 15 04/25/2022 09:01 AM    CREATININE 0.90 04/25/2022 09:01 AM     Lab Results   Component Value Date/Time    ALKPHOSPHAT 69 11/20/2021 01:40 PM    ASTSGOT 20 11/20/2021 01:40 PM    ALTSGPT 19 " 11/20/2021 01:40 PM    TBILIRUBIN 0.3 11/20/2021 01:40 PM       Lab Results   Component Value Date/Time    HBA1C 7.3 (H) 04/25/2022 09:01 AM       No results found for: MICROALBCALC, MALBCRT, MALBEXCR, [id number], MICROALBUR, MICRALB, UMICROALBUM, MICROALBTIM          Carotid US 12/2022   FINDINGS   Right-   Heterogeneous plaque of the bifurcation extending into the internal carotid    artery. <50% internal carotid artery stenosis.    Plaque is irregular on the surface and heterogeneous with mixed    echogenicity.    The subclavian artery waveforms are antegrade and normal in character and    velocity.    Left-   Carotid endarterectomy is present and appears patent.    Elevated velocities in the proximal subclavian artery consistent with >50%    stenosis.     Medical Decision Making:  Today's Assessment / Status / Plan:     1. Type 2 diabetes mellitus without complication, with long-term current use of insulin ([facility])  HEMOGLOBIN A1C      2. Hyperlipidemia, unspecified hyperlipidemia type  Lipid Profile      3. Hypertension, unspecified type  losartan-hydrochlorothiazide (HYZAAR) 100-25 MG per tablet    Comp Metabolic Panel      4. Stenosis of left carotid artery           Patient Type: Secondary Prevention    Etiology of Established CVD if Present:     Carotid stenosis, asymptomatic, status post left CEA March 2022 -doing well postoperatively.  Carotid US done 12/2022 showing no significant stenosis.  Recheck carotid ultrasound 1 year, due 12/2023.  Medical management as per below    Lipid Management: Qualifies for Statin Therapy Based on 2018 ACC/AHA Guidelines: yes  Calculated 10-Year Risk of ASCVD: N/A  Currently on Statin: Yes  Goal LDL less than 70 non-HDL less than 100  Currently on moderate intensity statin  Plan:  -Recheck fasting lipid panel and lipoprotein a - orders reprinted, pt to do prior to next appt  -Continue rosuvastatin 5 mg a day   -Intensify therapy if above goal    Blood Pressure  Management:  Acc[facility] (2017) Blood Pressure Goal <130/80  Appears very elevated in office   Only slightly above goal at home   Leg swelling on amlodipine  Did not tolerate chlorthalidone for unclear reasons in the past  Pt reports they are typically elevated in office.    Denies CP, SOB, HA, vision changes, or other CVA/TIA like symptoms.    04/2023 ABPM 134/71  Plan:  -Continue losartan 100 mg daily  -Continue HCTZ-- change to combo pill Losartan-HCTZ  -Intensify lifestyle modification as per below  -Recheck GFR, electrolytes, urine for albumin - orders reprinted  -Continue home blood pressure monitoring - reviewed and given log  Glycemic Status: Diabetic  Goal A1c less than 7.5%  Last A1C 8.1% (Feb- [facility])   Avoidance of hypoglycemia paramount  Control seems reasonable on most recent blood work and fingersticks  Has follow up appt with PCP at [facility] as planned this month- will bring A1C to next appt if they do in office at [facility]  Plan:  -Continue Lantus as directed by [facility]   -Continue Synjardy   -Continue Trulicity   -Recheck A1c in 3 months   -Call if any hypoglycemia  -Continue fingersticks    Anti-Platelet/Anti-Coagulant Tx: yes  Continue indefinite low-dose aspirin    Smoking: continue complete avoidance of all tobacco products    Physical Activity: More regular walking recommended    Weight Management and Nutrition: Continue good diabetic diet; slow steady weight loss to help reduce A1C     Instructed to follow-up with PCP for remainder of adult medical needs: yes  We will partner with other providers in the management of established vascular disease and cardiometabolic risk factors.    Studies to Be Obtained: None   Labs to Be Obtained: as ordered above    Follow up in: 6 months     Lynne BRODERICK  [facility]

## 2023-09-19 DIAGNOSIS — I65.22 STENOSIS OF LEFT CAROTID ARTERY: ICD-10-CM

## 2023-09-19 NOTE — PROGRESS NOTES
Michael J Bloch, M.D. (Physician)   Vascular Medicine   Encounter Date: 12/19/2022   Signed  Carotid duplex without signficiant stenosis.   Will recheck in one year.      Michael Bloch, MD  Vascular Care          Orders placed for vascular surveillance imaging.    UB. message sent to pt to remind that they are due for their surveillance vascular imaging.         Zaira Cespedes R.N.   [facility]

## 2023-10-20 LAB — HBA1C MFR BLD: 7.4 % (ref ?–5.8)

## 2023-11-01 ENCOUNTER — APPOINTMENT (OUTPATIENT)
Dept: VASCULAR LAB | Facility: MEDICAL CENTER | Age: 68
End: 2023-11-01
Attending: NURSE PRACTITIONER
Payer: MEDICAID

## 2023-11-16 ENCOUNTER — OFFICE VISIT (OUTPATIENT)
Dept: VASCULAR LAB | Facility: MEDICAL CENTER | Age: 68
End: 2023-11-16
Payer: MEDICAID

## 2023-11-16 VITALS
WEIGHT: 203 LBS | DIASTOLIC BLOOD PRESSURE: 76 MMHG | BODY MASS INDEX: 28.42 KG/M2 | SYSTOLIC BLOOD PRESSURE: 134 MMHG | HEIGHT: 71 IN | HEART RATE: 90 BPM

## 2023-11-16 DIAGNOSIS — Z79.4 TYPE 2 DIABETES MELLITUS WITHOUT COMPLICATION, WITH LONG-TERM CURRENT USE OF INSULIN (HCC): ICD-10-CM

## 2023-11-16 DIAGNOSIS — I10 HYPERTENSION, UNSPECIFIED TYPE: ICD-10-CM

## 2023-11-16 DIAGNOSIS — E11.9 TYPE 2 DIABETES MELLITUS WITHOUT COMPLICATION, WITH LONG-TERM CURRENT USE OF INSULIN (HCC): ICD-10-CM

## 2023-11-16 DIAGNOSIS — E78.5 HYPERLIPIDEMIA, UNSPECIFIED HYPERLIPIDEMIA TYPE: ICD-10-CM

## 2023-11-16 PROCEDURE — 3075F SYST BP GE 130 - 139MM HG: CPT

## 2023-11-16 PROCEDURE — 3078F DIAST BP <80 MM HG: CPT

## 2023-11-16 PROCEDURE — 99212 OFFICE O/P EST SF 10 MIN: CPT

## 2023-11-16 PROCEDURE — 99214 OFFICE O/P EST MOD 30 MIN: CPT

## 2023-11-16 ASSESSMENT — FIBROSIS 4 INDEX: FIB4 SCORE: 1.42

## 2023-11-17 NOTE — PROGRESS NOTES
VASCULAR MEDICINE CLINIC - Follow up VISIT  11/16/2023    Dixon Chung is a 68 y.o. male who presents today for follow up vascular evaluation    Subjective      HPI:  Patient referred for evaluation and management of carotid artery disease in the setting of dyslipidemia, hypertension, diabetes and found to have high-grade left carotid stenosis as incidental finding on imaging study had left carotid endarterectomy March 2022    Doing well,   Has been dizzy constantly, seeing ENT.  Has a tumor behind ear canal and has upcoming imaging to help determine next steps.  Taking meclizine which is helping  No TIA/CVA symptoms  Denies any CV complaints  RONAK closely managing his DM, had A1c done 10/2023 7.4%  AM FSBS 100-130s  Not exercising regularly- usually walks more in summer  Tolerating all his meds well  Home bps 120s/70-80s, rare checking  White coat htn in offices  Stopped asa due to gi upset,   On rosuva without myalgias  Improved diet, watching sugars and carbs, no sodas  Has carotid ultrasound scheduled for dec    Family History   Problem Relation Age of Onset    Heart Disease Father         cabg 44    Hypertension Father     Diabetes Paternal Aunt       Social History     Tobacco Use    Smoking status: Never    Smokeless tobacco: Never   Vaping Use    Vaping Use: Never used   Substance Use Topics    Alcohol use: Yes     Alcohol/week: 2.0 oz     Types: 4 Cans of beer per week     Comment: couple of beers on the weekend    Drug use: Yes     Types: Inhaled     Comment: Marijuana 4/2/22      Current Outpatient Medications on File Prior to Visit   Medication Sig Dispense Refill    losartan-hydrochlorothiazide (HYZAAR) 100-25 MG per tablet Take 1 Tablet by mouth every day. 90 Tablet 3    BD PEN NEEDLE LOS U/F USE TO INJECT INSULIN AS DIRECTED      Dulaglutide (TRULICITY SC) Inject 1 Dose under the skin every Monday.      Empagliflozin-metFORMIN HCl (SYNJARDY) 12.5-1000 MG Tab Take 1 tablet by mouth 2 times a day.  "     rosuvastatin (CRESTOR) 5 MG Tab Take 5 mg by mouth every day.      gabapentin (NEURONTIN) 100 MG Cap Take 100 mg by mouth 4 times a day. Pt takes @@ 2300, 0100, 0300, and 0500      insulin glargine (LANTUS) 100 UNIT/ML Solution Pen-injector injection Inject 20 Units under the skin every evening.       No current facility-administered medications on file prior to visit.      ALLERGIES  Amlodipine, Lisinopril, and Other environmental     DIET AND EXERCISE:  Weight Change: stable  Diet: decent diabeteic diet  Exercise: sporadic irregular exercise         Objective     Objective:     Vitals:    11/16/23 1618 11/16/23 1622   BP: (!) 142/79 134/76   BP Location: Left arm Left arm   Patient Position: Sitting Sitting   BP Cuff Size: Adult Adult   Pulse: 97 90   Weight: 92.1 kg (203 lb)    Height: 1.803 m (5' 11\")      Physical Exam  Vitals reviewed.   Constitutional:       General: He is not in acute distress.     Appearance: Normal appearance. He is not diaphoretic.   HENT:      Head: Normocephalic and atraumatic.   Eyes:      General: No scleral icterus.     Conjunctiva/sclera: Conjunctivae normal.   Cardiovascular:      Rate and Rhythm: Normal rate and regular rhythm.      Heart sounds: Normal heart sounds. No murmur heard.  Pulmonary:      Effort: Pulmonary effort is normal. No respiratory distress.      Breath sounds: Normal breath sounds. No wheezing or rales.   Musculoskeletal:      Right lower leg: No edema.      Left lower leg: No edema.   Skin:     General: Skin is warm and dry.      Coloration: Skin is not pale.   Neurological:      General: No focal deficit present.      Mental Status: He is alert and oriented to person, place, and time.      Coordination: Coordination normal.      Gait: Gait is intact. Gait normal.   Psychiatric:         Mood and Affect: Mood and affect normal.         Behavior: Behavior normal.        DATA REVIEW    No results found for: \"CHOLSTRLTOT\", \"LDL\", \"HDL\", \"TRIGLYCERIDE\"    Lab " "Results   Component Value Date/Time    SODIUM 142 04/25/2022 09:01 AM    POTASSIUM 4.6 04/25/2022 09:01 AM    CHLORIDE 102 04/25/2022 09:01 AM    CO2 23 04/25/2022 09:01 AM    GLUCOSE 190 (H) 04/25/2022 09:01 AM    BUN 15 04/25/2022 09:01 AM    CREATININE 0.90 04/25/2022 09:01 AM     Lab Results   Component Value Date/Time    ALKPHOSPHAT 69 11/20/2021 01:40 PM    ASTSGOT 20 11/20/2021 01:40 PM    ALTSGPT 19 11/20/2021 01:40 PM    TBILIRUBIN 0.3 11/20/2021 01:40 PM       Lab Results   Component Value Date/Time    HBA1C 7.4 (A) 10/20/2023 12:00 AM       No results found for: \"MICROALBCALC\", \"MALBCRT\", \"MALBEXCR\", \"SSEYVJ36\", \"MICROALBUR\", \"MICRALB\", \"UMICROALBUM\", \"MICROALBTIM\"          Carotid US 12/2022   FINDINGS   Right-   Heterogeneous plaque of the bifurcation extending into the internal carotid    artery. <50% internal carotid artery stenosis.    Plaque is irregular on the surface and heterogeneous with mixed    echogenicity.    The subclavian artery waveforms are antegrade and normal in character and    velocity.    Left-   Carotid endarterectomy is present and appears patent.    Elevated velocities in the proximal subclavian artery consistent with >50%    stenosis.     Medical Decision Making:  Today's Assessment / Status / Plan:     1. Type 2 diabetes mellitus without complication, with long-term current use of insulin ([facility])  HEMOGLOBIN A1C (Glycohemoglobin GHB Total/A1C with MBG Estimate)    Comp Metabolic Panel    MICROALBUMIN CREAT RATIO URINE      2. Hyperlipidemia, unspecified hyperlipidemia type  Lipid Profile    Comp Metabolic Panel      3. Hypertension, unspecified type  Comp Metabolic Panel    MICROALBUMIN CREAT RATIO URINE         Patient Type: Secondary Prevention    Etiology of Established CVD if Present:     Carotid stenosis, asymptomatic, status post left CEA March 2022 -doing well postoperatively.  Carotid US done 12/2022 showing no significant stenosis.  Recheck carotid ultrasound 1 year, due " 12/2023 - ordered and scheduled.  Medical management as per below    Lipid Management: Qualifies for Statin Therapy Based on 2018 ACC/AHA Guidelines: yes  Calculated 10-Year Risk of ASCVD: N/A  Currently on Statin: Yes  Goal LDL less than 70 non-HDL less than 100  Currently on moderate intensity statin  No current labs  Plan:  -Recheck fasting lipid panel  pt to do prior to next appt, consider lp(a) in future (previously ordered and not done)  -Continue rosuvastatin 5 mg a day   -Intensify therapy if above goal    Blood Pressure Management:  Acc/aha (2017) Blood Pressure Goal <130/80  Appears very elevated in office   Good control at home per report  Leg swelling on amlodipine  Did not tolerate chlorthalidone for unclear reasons in the past  Pt reports they are typically elevated in office.    Denies CP, SOB, HA, vision changes, or other CVA/TIA like symptoms.    04/2023 ABPM 134/71  Plan:  -Continue losartan (combo) 100 mg daily  -Continue HCTZ  25mg (combo) daily  -Intensify lifestyle modification as per below  -Recheck GFR, electrolytes, urine for albumin -prior to next appt  -Continue home blood pressure monitoring - reviewed and given log  Glycemic Status: Diabetic  Goal A1c less than 7.5%  Last A1C 7.4% (10/2023 at PCP office), improved from 8.1%   Avoidance of hypoglycemia paramount  Control seems reasonable on most recent blood work and fingersticks  Has follow up appt with PCP at [facility] as planned next month  Plan:  -Continue Lantus as directed by [facility]   -Continue Synjardy   -Continue Trulicity   -Recheck A1c in 3 months   -Call if any hypoglycemia  -Continue fingersticks    Anti-Platelet/Anti-Coagulant Tx: yes  Continue indefinite low-dose aspirin    Smoking: continue complete avoidance of all tobacco products    Physical Activity: More regular walking recommended    Weight Management and Nutrition: Continue good diabetic diet; slow steady weight loss to help reduce A1C     Instructed to follow-up with PCP for  remainder of adult medical needs: yes  We will partner with other providers in the management of established vascular disease and cardiometabolic risk factors.    Studies to Be Obtained: carotid 12/2023 - ordered and scheduled  Labs to Be Obtained: lipid, A1c, cmp, microalb    Follow up in: 6 months     Shefali MCNAMARA  [facility]

## 2023-12-19 ENCOUNTER — HOSPITAL ENCOUNTER (OUTPATIENT)
Dept: RADIOLOGY | Facility: MEDICAL CENTER | Age: 68
End: 2023-12-19
Attending: INTERNAL MEDICINE
Payer: MEDICAID

## 2023-12-19 ENCOUNTER — DOCUMENTATION (OUTPATIENT)
Dept: VASCULAR LAB | Facility: MEDICAL CENTER | Age: 68
End: 2023-12-19

## 2023-12-19 DIAGNOSIS — I65.22 STENOSIS OF LEFT CAROTID ARTERY: ICD-10-CM

## 2023-12-19 PROCEDURE — 93880 EXTRACRANIAL BILAT STUDY: CPT

## 2023-12-20 NOTE — PROGRESS NOTES
Carotid duplex without significant restenosis  Repeat carotid duplex in 1 year-December 2024    Michael J. Bloch, MD  Vascular Care

## 2023-12-22 ENCOUNTER — DOCUMENTATION (OUTPATIENT)
Dept: VASCULAR LAB | Facility: MEDICAL CENTER | Age: 68
End: 2023-12-22
Payer: MEDICAID

## 2023-12-22 NOTE — PROGRESS NOTES
Spoke with patient to relay below message from Dr. Bloch:    Michael J Bloch, M.D.  Zaira Cespedes, LEVI.; Aditya Alberto, Med Ass't  BC - update dax  KO - let patient know that u/s looks fine     Patient states understanding and does not have any questions or concerns at this time.       Aditya Alberto, Medical Assistant   [facility]   Ph: [phone]  Fx: [phone]

## 2024-03-29 NOTE — ED NOTES
Given discharge instructions, verbalized understanding, left with all belongings, ambulates to ED lobby.     See my result note.

## 2024-05-20 ENCOUNTER — OFFICE VISIT (OUTPATIENT)
Dept: VASCULAR LAB | Facility: MEDICAL CENTER | Age: 69
End: 2024-05-20
Attending: NURSE PRACTITIONER
Payer: MEDICARE

## 2024-05-20 ENCOUNTER — APPOINTMENT (OUTPATIENT)
Dept: VASCULAR LAB | Facility: MEDICAL CENTER | Age: 69
End: 2024-05-20
Payer: MEDICAID

## 2024-05-20 VITALS
DIASTOLIC BLOOD PRESSURE: 96 MMHG | WEIGHT: 198 LBS | BODY MASS INDEX: 27.72 KG/M2 | HEART RATE: 74 BPM | SYSTOLIC BLOOD PRESSURE: 205 MMHG | HEIGHT: 71 IN

## 2024-05-20 DIAGNOSIS — Z79.4 TYPE 2 DIABETES MELLITUS WITHOUT COMPLICATION, WITH LONG-TERM CURRENT USE OF INSULIN (HCC): ICD-10-CM

## 2024-05-20 DIAGNOSIS — I10 HYPERTENSION, UNSPECIFIED TYPE: ICD-10-CM

## 2024-05-20 DIAGNOSIS — E11.9 TYPE 2 DIABETES MELLITUS WITHOUT COMPLICATION, WITH LONG-TERM CURRENT USE OF INSULIN (HCC): ICD-10-CM

## 2024-05-20 DIAGNOSIS — I65.22 STENOSIS OF LEFT CAROTID ARTERY: ICD-10-CM

## 2024-05-20 DIAGNOSIS — E78.5 HYPERLIPIDEMIA, UNSPECIFIED HYPERLIPIDEMIA TYPE: ICD-10-CM

## 2024-05-20 PROCEDURE — 3077F SYST BP >= 140 MM HG: CPT | Performed by: NURSE PRACTITIONER

## 2024-05-20 PROCEDURE — 3080F DIAST BP >= 90 MM HG: CPT | Performed by: NURSE PRACTITIONER

## 2024-05-20 PROCEDURE — 99214 OFFICE O/P EST MOD 30 MIN: CPT | Performed by: NURSE PRACTITIONER

## 2024-05-20 RX ORDER — SPIRONOLACTONE 25 MG/1
12.5 TABLET ORAL DAILY
Qty: 45 TABLET | Refills: 3 | Status: SHIPPED | OUTPATIENT
Start: 2024-05-20

## 2024-05-20 NOTE — PROGRESS NOTES
VASCULAR MEDICINE CLINIC - Follow up VISIT  05/20/2024    Dixon Chung is a 68 y.o. male who presents today for follow up vascular evaluation    Subjective      HPI:  Patient referred for evaluation and management of carotid artery disease in the setting of dyslipidemia, hypertension, diabetes and found to have high-grade left carotid stenosis as incidental finding on imaging study had left carotid endarterectomy March 2022  Continues to have vertigo-- had a delay in care with his ENT due to lapse in insurance coverage-- now back to see ENT regarding tumor behind his ear canal  No TIA/CVA symptoms  Denies any CV complaints  RONAK closely managing his DM- recent A1C (done in PCP office) extremely high per pt report- does not know number  Started Trulicity which is helping his daily sugars   Not exercising regularly- usually walks more in summer  Tolerating all his meds well  Home bps 130-80's- known white coat HTN   On rosuva without myalgias  Improved diet, watching sugars and carbs, no sodas    Family History   Problem Relation Age of Onset    Heart Disease Father         cabg 44    Hypertension Father     Diabetes Paternal Aunt       Social History     Tobacco Use    Smoking status: Never    Smokeless tobacco: Never   Vaping Use    Vaping status: Never Used   Substance Use Topics    Alcohol use: Yes     Alcohol/week: 2.0 oz     Types: 4 Cans of beer per week     Comment: couple of beers on the weekend    Drug use: Yes     Types: Inhaled     Comment: Marijuana 4/2/22      Current Outpatient Medications on File Prior to Visit   Medication Sig Dispense Refill    losartan-hydrochlorothiazide (HYZAAR) 100-25 MG per tablet Take 1 Tablet by mouth every day. 90 Tablet 3    BD PEN NEEDLE LOS U/F USE TO INJECT INSULIN AS DIRECTED      Dulaglutide (TRULICITY SC) Inject 1 Dose under the skin every Monday.      Empagliflozin-metFORMIN HCl (SYNJARDY) 12.5-1000 MG Tab Take 1 tablet by mouth 2 times a day.      rosuvastatin  "(CRESTOR) 5 MG Tab Take 5 mg by mouth every day.      gabapentin (NEURONTIN) 100 MG Cap Take 100 mg by mouth 4 times a day. Pt takes @@ 2300, 0100, 0300, and 0500      insulin glargine (LANTUS) 100 UNIT/ML Solution Pen-injector injection Inject 20 Units under the skin every evening.       No current facility-administered medications on file prior to visit.      ALLERGIES  Amlodipine, Lisinopril, and Other environmental     DIET AND EXERCISE:  Weight Change: stable  Diet: decent diabeteic diet  Exercise: sporadic irregular exercise         Objective     Objective:     Vitals:    05/20/24 0916   BP: (!) 210/106   BP Location: Left arm   Patient Position: Sitting   BP Cuff Size: Adult   Pulse: 82   Weight: 89.8 kg (198 lb)   Height: 1.803 m (5' 11\")     Physical Exam  Vitals reviewed.   Constitutional:       General: He is not in acute distress.     Appearance: Normal appearance. He is not diaphoretic.   HENT:      Head: Normocephalic and atraumatic.   Eyes:      General: No scleral icterus.     Conjunctiva/sclera: Conjunctivae normal.   Cardiovascular:      Rate and Rhythm: Normal rate and regular rhythm.      Heart sounds: Normal heart sounds. No murmur heard.  Pulmonary:      Effort: Pulmonary effort is normal. No respiratory distress.      Breath sounds: Normal breath sounds. No wheezing or rales.   Musculoskeletal:      Right lower leg: No edema.      Left lower leg: No edema.   Skin:     General: Skin is warm and dry.      Coloration: Skin is not pale.   Neurological:      General: No focal deficit present.      Mental Status: He is alert and oriented to person, place, and time.      Coordination: Coordination normal.      Gait: Gait is intact. Gait normal.   Psychiatric:         Mood and Affect: Mood and affect normal.         Behavior: Behavior normal.        DATA REVIEW    Lab Results   Component Value Date/Time    SODIUM 142 04/25/2022 09:01 AM    POTASSIUM 4.6 04/25/2022 09:01 AM    CHLORIDE 102 04/25/2022 " 09:01 AM    CO2 23 04/25/2022 09:01 AM    GLUCOSE 190 (H) 04/25/2022 09:01 AM    BUN 15 04/25/2022 09:01 AM    CREATININE 0.90 04/25/2022 09:01 AM     Lab Results   Component Value Date/Time    ALKPHOSPHAT 69 11/20/2021 01:40 PM    ASTSGOT 20 11/20/2021 01:40 PM    ALTSGPT 19 11/20/2021 01:40 PM    TBILIRUBIN 0.3 11/20/2021 01:40 PM       Lab Results   Component Value Date/Time    HBA1C 7.4 (A) 10/20/2023 12:00 AM          Carotid US 12/2022   FINDINGS   Right-   Heterogeneous plaque of the bifurcation extending into the internal carotid    artery. <50% internal carotid artery stenosis.    Plaque is irregular on the surface and heterogeneous with mixed    echogenicity.    The subclavian artery waveforms are antegrade and normal in character and    velocity.    Left-   Carotid endarterectomy is present and appears patent.    Elevated velocities in the proximal subclavian artery consistent with >50%    stenosis.     Medical Decision Making:  Today's Assessment / Status / Plan:     1. Type 2 diabetes mellitus without complication, with long-term current use of insulin (HCC)        2. Stenosis of left carotid artery        3. Hypertension, unspecified type        4. Hyperlipidemia, unspecified hyperlipidemia type           Patient Type: Secondary Prevention    Etiology of Established CVD if Present:     Carotid stenosis, asymptomatic, status post left CEA March 2022 -doing well postoperatively.  Carotid US done 12/2022 showing no significant stenosis.  Recheck carotid ultrasound 1 year, due 12/2023 - ordered and scheduled.  Medical management as per below    Lipid Management: Qualifies for Statin Therapy Based on 2018 ACC/AHA Guidelines: yes  Calculated 10-Year Risk of ASCVD: N/A  Currently on Statin: Yes  Goal LDL less than 70 non-HDL less than 100  Currently on moderate intensity statin  No current labs  Plan:  -Recheck fasting lipid panel  pt to do prior to next appt, consider lp(a) in future (previously ordered and  not done)  -Continue rosuvastatin 5 mg a day   -Intensify therapy if above goal    Blood Pressure Management:  Acc/aha (2017) Blood Pressure Goal <130/80  Appears very elevated in office - long history of white coat HTN  Good control at home per report  Leg swelling on amlodipine  Did not tolerate chlorthalidone for unclear reasons in the past  Denies CP, SOB, HA, vision changes, or other CVA/TIA like symptoms.    04/2023 ABPM 134/71  Plan:  -Continue losartan (combo) 100 mg daily  -Continue HCTZ  25mg (combo) daily  -Start spironolactone 12.5mg daily    -Intensify lifestyle modification as per below  -Recheck GFR, electrolytes, urine for albumin -prior to next appt  -Continue home blood pressure monitoring - reviewed and given log- bring to next appt     Glycemic Status: Diabetic  Goal A1c less than 7.5%  Last A1C reports very high- unknown number done in PCP office    Avoidance of hypoglycemia paramount  Control seems reasonable on most recent blood work and fingersticks  Has follow up appt with PCP at [facility] - in about 4 weeks   Plan:  -Continue Lantus as directed by [facility]   -Continue Synjardy   -Continue Trulicity   -Recheck A1c in 3 months   -Call if any hypoglycemia  -Continue fingersticks    Anti-Platelet/Anti-Coagulant Tx: yes  Continue indefinite low-dose aspirin    Smoking: continue complete avoidance of all tobacco products    Physical Activity: More regular walking recommended    Weight Management and Nutrition: Continue good diabetic diet; slow steady weight loss to help reduce A1C     Instructed to follow-up with PCP for remainder of adult medical needs: yes  We will partner with other providers in the management of established vascular disease and cardiometabolic risk factors.    Studies to Be Obtained: carotid 12/2024   Labs to Be Obtained: BMP in 1 month; lipid, A1c, cmp, microalb-- prior to next appt     Follow up in: 6 months     Lynne Harrington [facility]  [place] [facility]  [facility]

## 2024-06-19 ENCOUNTER — TELEPHONE (OUTPATIENT)
Dept: VASCULAR LAB | Facility: MEDICAL CENTER | Age: 69
End: 2024-06-19
Payer: MEDICARE

## 2024-06-19 LAB — HBA1C MFR BLD: 7.6 % (ref ?–5.8)

## 2024-06-19 NOTE — TELEPHONE ENCOUNTER
[facility] MED    Caller: Dixon Chung    Topic/issue: MEDICAL ADVICE    Getachew states that he had his tests completed today and he would like to know when Lynne Harrington would like to see him back. Please advise.    Thank you,  Tu ROBERTS    Callback Number: [phone] (home)

## 2024-06-20 DIAGNOSIS — E11.9 TYPE 2 DIABETES MELLITUS WITHOUT COMPLICATION, WITH LONG-TERM CURRENT USE OF INSULIN (HCC): ICD-10-CM

## 2024-06-20 DIAGNOSIS — I65.22 STENOSIS OF LEFT CAROTID ARTERY: ICD-10-CM

## 2024-06-20 DIAGNOSIS — E78.5 HYPERLIPIDEMIA, UNSPECIFIED HYPERLIPIDEMIA TYPE: ICD-10-CM

## 2024-06-20 DIAGNOSIS — Z79.4 TYPE 2 DIABETES MELLITUS WITHOUT COMPLICATION, WITH LONG-TERM CURRENT USE OF INSULIN (HCC): ICD-10-CM

## 2024-06-20 DIAGNOSIS — I10 HYPERTENSION, UNSPECIFIED TYPE: ICD-10-CM

## 2024-06-25 DIAGNOSIS — I10 HYPERTENSION, UNSPECIFIED TYPE: ICD-10-CM

## 2024-06-25 NOTE — PROGRESS NOTES
"Spoke with pt, who reports of occasional HOTN after starting spironolactone 80s/50s.  Reports staying adequately hydrated.  Also reports BPs were low prior to taking bp meds on those days (<100).  Reports most BPs 110-130s.    Is frequently dizzy/lightheaded, but does note he has vertigo, and is being worked up for possible \"tumor\" behind ear.  Is pending an MRI.    Recommend continue with current regimen, but will check ABPM to determine overall home control.    Will have pt HOLD spironolactone IF bp <100 in am before taking bp meds.      Recommend pt wait to take losartan/hctz until later in am (after breakfast and some fluids) as well if BP <100.     Will have pt back to review ABPM, or sooner if ongoing HOTN.  MA to call and schedule.      Shefali MCNAMARA  [facility]      "

## 2024-07-08 ENCOUNTER — DOCUMENTATION (OUTPATIENT)
Dept: VASCULAR LAB | Facility: MEDICAL CENTER | Age: 69
End: 2024-07-08
Payer: MEDICARE

## 2024-07-09 ENCOUNTER — DOCUMENTATION (OUTPATIENT)
Dept: VASCULAR LAB | Facility: MEDICAL CENTER | Age: 69
End: 2024-07-09
Payer: MEDICARE

## 2024-07-26 ENCOUNTER — TELEPHONE (OUTPATIENT)
Dept: VASCULAR LAB | Facility: MEDICAL CENTER | Age: 69
End: 2024-07-26
Payer: MEDICARE

## 2024-09-10 DIAGNOSIS — I65.22 STENOSIS OF LEFT CAROTID ARTERY: ICD-10-CM

## 2024-09-10 NOTE — PROGRESS NOTES
Studies to Be Obtained: carotid 12/2024     Orders placed for vascular surveillance imaging.    [employer] message sent to pt to remind that they are due for their surveillance vascular imaging.       Zaira Cespedes R.N.   [facility]

## 2024-12-30 ENCOUNTER — APPOINTMENT (OUTPATIENT)
Dept: VASCULAR LAB | Facility: MEDICAL CENTER | Age: 69
End: 2024-12-30
Payer: MEDICARE

## (undated) DEVICE — DRAPE MAGNETIC (INSTRA-MAG) - (30/CA)

## (undated) DEVICE — Device

## (undated) DEVICE — SPONGE GAUZESTER 4 X 4 4PLY - (128PK/CA)

## (undated) DEVICE — TOWEL STOP TIMEOUT SAFETY FLAG (40EA/CA)

## (undated) DEVICE — SET LEADWIRE 5 LEAD BEDSIDE DISPOSABLE ECG (1SET OF 5/EA)

## (undated) DEVICE — KIT SURGIFLO W/OUT THROMBIN - (6EA/CA)

## (undated) DEVICE — SODIUM CHL. INJ. 0.9% 500ML (24EA/CA 50CA/PF)

## (undated) DEVICE — CANISTER SUCTION 3000ML MECHANICAL FILTER AUTO SHUTOFF MEDI-VAC NONSTERILE LF DISP  (40EA/CA)

## (undated) DEVICE — VESSELOOP MAXI BLUE STERILE- SURG-I-LOOP (10EA/BX)

## (undated) DEVICE — SENSOR SPO2 NEO LNCS ADHESIVE (20/BX) SEE USER NOTES

## (undated) DEVICE — GOWN SURGEONS X-LARGE - DISP. (30/CA)

## (undated) DEVICE — NEEDLE NON SAFETY HYPO 22 GA X 1 1/2 IN (100/BX)

## (undated) DEVICE — SHEET THYROID - (10EA/CA)

## (undated) DEVICE — SUTURE 4-0 30CM STRATAFIX SPIRAL PS-2 (12EA/BX)

## (undated) DEVICE — SUTURE 6-0 PROLENE C-1 D/A 24 (36PK/BX)"

## (undated) DEVICE — SUTURE CV

## (undated) DEVICE — LACTATED RINGERS INJ 1000 ML - (14EA/CA 60CA/PF)

## (undated) DEVICE — KIT RADIAL ARTERY 20GA W/MAX BARRIER AND BIOPATCH  (5EA/CA) #10740 IS FOR THE SET RADIAL ARTERIAL

## (undated) DEVICE — SYRINGE 20 ML LS (48/BX 4BX/CA)

## (undated) DEVICE — SLEEVE VASO CALF MED - (10PR/CA)

## (undated) DEVICE — SENSOR SPO2 ADULT LNCS ADTX (20/BX) ORDER ITEM #19593

## (undated) DEVICE — CLIP SM INTNL HRZN TI ESCP LGT - (24EA/PK 25PK/BX)

## (undated) DEVICE — CLIP MED INTNL HRZN TI ESCP - (25/BX)

## (undated) DEVICE — INTRAOP NEURO IN OR 1:1 PER 15 MIN

## (undated) DEVICE — MASK ANESTHESIA ADULT  - (100/CA)

## (undated) DEVICE — DRAPE IOBAN II INCISE 23X17 - (10EA/BX 4BX/CA)

## (undated) DEVICE — PROTECTOR ULNA NERVE - (36PR/CA)

## (undated) DEVICE — PACK SINGLE BASIN - (6/CA)

## (undated) DEVICE — TUBE E-T HI-LO CUFF 8.0MM (10EA/PK)

## (undated) DEVICE — TOWELS CLOTH SURGICAL - (4/PK 20PK/CA)

## (undated) DEVICE — SUTURE 3-0 SILK 12 X 18 IN - (36/BX)

## (undated) DEVICE — BAG SPONGE COUNT 10.25 X 32 - BLUE (250/CA)

## (undated) DEVICE — SUTURE 2-0 ETHILON FS - (36/BX) 18 INCH

## (undated) DEVICE — HEAD HOLDER JUNIOR/ADULT

## (undated) DEVICE — SUTURE GENERAL

## (undated) DEVICE — CHLORAPREP 26 ML APPLICATOR - ORANGE TINT(25/CA)

## (undated) DEVICE — SUTURE 2-0 VICRYL PLUS CT-1 36 (36PK/BX)"

## (undated) DEVICE — STAPLER SKIN DISP - (6/BX 10BX/CA) VISISTAT

## (undated) DEVICE — SUCTION INSTRUMENT YANKAUER BULBOUS TIP W/O VENT (50EA/CA)

## (undated) DEVICE — GOWN WARMING STANDARD FLEX - (30/CA)

## (undated) DEVICE — SET EXTENSION WITH 2 PORTS (48EA/CA) ***PART #2C8610 IS A SUBSTITUTE*****

## (undated) DEVICE — GLOVE BIOGEL SZ 6 PF LATEX - (50EA/BX 4BX/CA)

## (undated) DEVICE — VESSELOOP MINI BLUE STERILE - SURG-I-LOOP (10EA/BX)

## (undated) DEVICE — POLY UMBILICAL TAPE 1/8X30 - (36/BX)

## (undated) DEVICE — SYRINGE 30 ML LL (56/BX)

## (undated) DEVICE — SOD. CHL. INJ. 0.9% 1000 ML - (14EA/CA 60CA/PF)

## (undated) DEVICE — KIT ANESTHESIA W/CIRCUIT & 3/LT BAG W/FILTER (20EA/CA)

## (undated) DEVICE — DRAIN J-VAC 7MM FLAT - (10EA/CA)

## (undated) DEVICE — ELECTRODE 850 FOAM ADHESIVE - HYDROGEL RADIOTRNSPRNT (50/PK)

## (undated) DEVICE — RESERVOIR SUCTION 100 CC - SILICONE (20EA/CA)

## (undated) DEVICE — WIPE INSTRUMENT MICROWIPE (20EA/SP)

## (undated) DEVICE — SUTURE 6-0 PROLENE BV-1 D/A 24 (36PK/BX)"

## (undated) DEVICE — NEPTUNE 4 PORT MANIFOLD - (20/PK)

## (undated) DEVICE — DRESSING TRANSPARENT FILM TEGADERM 4 X 4.75" (50EA/BX)"

## (undated) DEVICE — ELECTRODE DUAL RETURN W/ CORD - (50/PK)

## (undated) DEVICE — SPONGE XRAY 8X4 STERL. 12PL - (10EA/TY 80TY/CA)

## (undated) DEVICE — GLOVE BIOGEL SZ 7 SURGICAL PF LTX - (50PR/BX 4BX/CA)

## (undated) DEVICE — TUBING CLEARLINK DUO-VENT - C-FLO (48EA/CA)

## (undated) DEVICE — GLOVE BIOGEL PI ORTHO SZ 6 SURGICAL PF LF (40PR/BX)

## (undated) DEVICE — BLADE SURGICAL #11 - (50/BX)